# Patient Record
Sex: FEMALE | Race: WHITE | NOT HISPANIC OR LATINO | URBAN - METROPOLITAN AREA
[De-identification: names, ages, dates, MRNs, and addresses within clinical notes are randomized per-mention and may not be internally consistent; named-entity substitution may affect disease eponyms.]

---
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
## 2019-01-08 ENCOUNTER — APPOINTMENT (OUTPATIENT)
Dept: ELECTROPHYSIOLOGY | Facility: CLINIC | Age: 19
End: 2019-01-08
Payer: COMMERCIAL

## 2019-01-08 VITALS
HEART RATE: 88 BPM | DIASTOLIC BLOOD PRESSURE: 74 MMHG | HEIGHT: 63 IN | SYSTOLIC BLOOD PRESSURE: 110 MMHG | OXYGEN SATURATION: 99 %

## 2019-01-08 PROCEDURE — 99214 OFFICE O/P EST MOD 30 MIN: CPT

## 2019-01-14 NOTE — REASON FOR VISIT
[Follow-Up - Clinic] : a clinic follow-up of [Dizziness] : dizziness [Palpitations] : palpitations [FreeTextEntry1] : Referral: Ryan Oconnell NP

## 2019-01-14 NOTE — ASSESSMENT
[FreeTextEntry1] : 30-day Holter monitor:\par Several episodes of symptoms that correlated with normal sinus rhythm. The majority of symptomatic episodes clustered within the 1st week of monitoring. Most palpitations correlated with sinus tachycardia up to the 120s. Occasional dizzy spells a/w sinus rhythm or sinus tach to 110s. There were several auto-triggered episodes of sinus tachycardia not correlated with symptoms. The fastest one of which was at 184 bpm c/w sinus tachycardia (pt reported going to the gym at that time). There were no bradyarrhythmias or significant tachyarrhythmias (VT or SVT).

## 2019-01-14 NOTE — REVIEW OF SYSTEMS
[Fever] : no fever [Chills] : no chills [Blurry Vision] : no blurred vision [Eyeglasses] : not currently wearing eyeglasses [Earache] : no earache [Mouth Sores] : no mouth sores [see HPI] : see HPI [Cough] : no cough [Wheezing] : no wheezing [Coughing Up Blood] : no hemoptysis [Abdominal Pain] : no abdominal pain [Nausea] : no nausea [Vomiting] : no vomiting [Dysuria] : no dysuria [Incontinence] : no incontinence [Joint Pain] : no joint pain [Muscle Cramps] : no muscle cramps [Skin: A Rash] : no rash: [Skin Lesions] : no skin lesions [Dizziness] : no dizziness [Convulsions] : no convulsions [Confusion] : no confusion was observed [Anxiety] : no anxiety [Excessive Thirst] : no polydipsia [Easy Bleeding] : no tendency for easy bleeding [Easy Bruising] : no tendency for easy bruising

## 2019-01-14 NOTE — PHYSICAL EXAM
[General Appearance - Well Developed] : well developed [Well Groomed] : well groomed [General Appearance - Well Nourished] : well nourished [No Deformities] : no deformities [General Appearance - In No Acute Distress] : no acute distress [FreeTextEntry1] : overweight [Normal Conjunctiva] : the conjunctiva exhibited no abnormalities [Normal Oral Mucosa] : normal oral mucosa [Normal Oropharynx] : normal oropharynx [Normal Jugular Venous A Waves Present] : normal jugular venous A waves present [Normal Jugular Venous V Waves Present] : normal jugular venous V waves present [Respiration, Rhythm And Depth] : normal respiratory rhythm and effort [Auscultation Breath Sounds / Voice Sounds] : lungs were clear to auscultation bilaterally [Heart Rate And Rhythm] : heart rate and rhythm were normal [Heart Sounds] : normal S1 and S2 [Murmurs] : no murmurs present [Arterial Pulses Normal] : the arterial pulses were normal [Edema] : no peripheral edema present [Abdomen Soft] : soft [Abdomen Tenderness] : non-tender [Abnormal Walk] : normal gait [Nail Clubbing] : no clubbing of the fingernails [Cyanosis, Localized] : no localized cyanosis [Skin Color & Pigmentation] : normal skin color and pigmentation [Skin Turgor] : normal skin turgor [] : no rash [Oriented To Time, Place, And Person] : oriented to person, place, and time [Impaired Insight] : insight and judgment were intact [Affect] : the affect was normal [Mood] : the mood was normal [No Anxiety] : not feeling anxious

## 2019-01-14 NOTE — DISCUSSION/SUMMARY
[FreeTextEntry1] : 17 yo w/dizziness and palpitations.\par \par Symptoms have improved significantly. Patient continues to remain active and go to the gym. No symptoms were associated with any rhythm other than normal sinus rhythm or sinus tachycardia. Additionally, echo reviewed which revealed a structurally normal heart. Explained to her the benign nature of the results and provided reassurance that no specific therapy is warranted at this time from an electrophysiologic perspective. \par \par Her father is working on getting her in to see neurology to further explore her dizziness episodes. \par \par Plan:\par 1. Continue regular exercise regimen\par 2. RTC prn

## 2019-01-25 ENCOUNTER — APPOINTMENT (OUTPATIENT)
Dept: NEUROLOGY | Facility: CLINIC | Age: 19
End: 2019-01-25
Payer: COMMERCIAL

## 2019-01-25 VITALS
BODY MASS INDEX: 31.89 KG/M2 | WEIGHT: 180 LBS | HEIGHT: 63 IN | DIASTOLIC BLOOD PRESSURE: 80 MMHG | SYSTOLIC BLOOD PRESSURE: 120 MMHG

## 2019-01-25 DIAGNOSIS — I95.1 ORTHOSTATIC HYPOTENSION: ICD-10-CM

## 2019-01-25 DIAGNOSIS — R51 HEADACHE: ICD-10-CM

## 2019-01-25 DIAGNOSIS — Z78.9 OTHER SPECIFIED HEALTH STATUS: ICD-10-CM

## 2019-01-25 DIAGNOSIS — R55 SYNCOPE AND COLLAPSE: ICD-10-CM

## 2019-01-25 PROCEDURE — 99204 OFFICE O/P NEW MOD 45 MIN: CPT

## 2019-01-25 NOTE — PHYSICAL EXAM
[General Appearance - Alert] : alert [General Appearance - In No Acute Distress] : in no acute distress [General Appearance - Well Nourished] : well nourished [General Appearance - Well Developed] : well developed [Person] : oriented to person [Place] : oriented to place [Time] : oriented to time [Short Term Intact] : short term memory intact [Remote Intact] : remote memory intact [Registration Intact] : recent registration memory intact [Span Intact] : the attention span was normal [Concentration Intact] : normal concentrating ability [Visual Intact] : visual attention was ~T not ~L decreased [Naming Objects] : no difficulty naming common objects [Repeating Phrases] : no difficulty repeating a phrase [Fluency] : fluency intact [Comprehension] : comprehension intact [Current Events] : adequate knowledge of current events [Past History] : adequate knowledge of personal past history [Cranial Nerves Optic (II)] : visual acuity intact bilaterally,  visual fields full to confrontation, pupils equal round and reactive to light [Cranial Nerves Oculomotor (III)] : extraocular motion intact [Cranial Nerves Trigeminal (V)] : facial sensation intact symmetrically [Cranial Nerves Facial (VII)] : face symmetrical [Cranial Nerves Vestibulocochlear (VIII)] : hearing was intact bilaterally [Cranial Nerves Glossopharyngeal (IX)] : tongue and palate midline [Cranial Nerves Accessory (XI - Cranial And Spinal)] : head turning and shoulder shrug symmetric [Cranial Nerves Hypoglossal (XII)] : there was no tongue deviation with protrusion [Motor Strength] : muscle strength was normal in all four extremities [No Muscle Atrophy] : normal bulk in all four extremities [Paresis Pronator Drift Right-Sided] : no pronator drift on the right [Paresis Pronator Drift Left-Sided] : no pronator drift on the left [Sensation Tactile Decrease] : light touch was intact [Sensation Pain / Temperature Decrease] : pain and temperature was intact [Sensation Vibration Decrease] : vibration was intact [Proprioception] : proprioception was intact [Balance] : balance was intact [Tremor] : no tremor present [Coordination - Dysmetria Impaired Finger-to-Nose Bilateral] : not present [2+] : Patella left 2+ [Sclera] : the sclera and conjunctiva were normal [PERRL With Normal Accommodation] : pupils were equal in size, round, reactive to light, with normal accommodation [Extraocular Movements] : extraocular movements were intact [Optic Disc Abnormality] : the optic disc were normal in size and color [No APD] : no afferent pupillary defect [No BRIAN] : no internuclear ophthalmoplegia [Full Visual Field] : full visual field [Edema] : there was no peripheral edema [Abnormal Walk] : normal gait [Involuntary Movements] : no involuntary movements were seen [Motor Tone] : muscle strength and tone were normal

## 2019-01-25 NOTE — CONSULT LETTER
[Dear  ___] : Dear  [unfilled], [Consult Letter:] : I had the pleasure of evaluating your patient, [unfilled]. [Please see my note below.] : Please see my note below. [Consult Closing:] : Thank you very much for allowing me to participate in the care of this patient.  If you have any questions, please do not hesitate to contact me. [Sincerely,] : Sincerely, [FreeTextEntry3] : Suleman Mercado M.D., Ph.D. DPN-N\par [facility]\par [facility] [place]\par Medical Director of Stroke Services\par [facility]\par

## 2019-01-25 NOTE — ASSESSMENT
[FreeTextEntry1] : This is a 18-year-old woman with orthostatic hypotension. John is likely blackout of vision when she becomes hypotensive because of decreased cerebral perfusion and ocular perfusion. Her optic discs to him he looked sharp I did not detect any swelling to suggest idiopathic intracranial hypertension however I did suggest that she see a neuro-ophthalmologist for better diagnosis. Regarding the pain in the head upon exertion I suggested that if they continue that she may premedicate that prior to working out. Her examination was normal today and there is no suspicion at this time for epilepsy. However, I did ask her to call me should she actually lose consciousness as opposed to just being dizzy and at that time neuroimaging as was an EEG will be performed. I would be happy to see her again in the future should the need arise.

## 2019-01-25 NOTE — REVIEW OF SYSTEMS
[Tension Headache] : tension-type headaches [As Noted in HPI] : as noted in HPI [Negative] : Heme/Lymph

## 2019-01-25 NOTE — HISTORY OF PRESENT ILLNESS
[FreeTextEntry1] : Initial office visit January 25, 2019:\par This is a 18-year-old woman who presents today for neurologic lesion. She's been getting episodes of dizziness a spinning as well as lightheadedness. It's worse with standing up. Occasionally she'll have the sensation that her vision is blacking out for a few seconds which resolves as she sits down. If she is working out when laying down, such as doing abdominal exercises, when she gets back up after finishing the exercise she may get a pain in the temple on the left which lasts for one to 2 minutes. It is not persists. It is not present during exercise. She saw, just yesterday who questioned if she had idiopathic intracranial hypertension. She does have a known diagnosis of orthostatic hypotension. She is here today for neurologic evaluation.

## 2019-02-05 LAB
ALBUMIN SERPL ELPH-MCNC: 3.9 G/DL
ALP BLD-CCNC: 50 U/L
ALT SERPL-CCNC: 13 U/L
ANION GAP SERPL CALC-SCNC: 10 MMOL/L
AST SERPL-CCNC: 16 U/L
BASOPHILS # BLD AUTO: 0.02 K/UL
BASOPHILS NFR BLD AUTO: 0.2 %
BILIRUB DIRECT SERPL-MCNC: 0.1 MG/DL
BILIRUB INDIRECT SERPL-MCNC: 0.2 MG/DL
BILIRUB SERPL-MCNC: 0.2 MG/DL
BUN SERPL-MCNC: 12 MG/DL
CALCIUM SERPL-MCNC: 9.1 MG/DL
CHLORIDE SERPL-SCNC: 104 MMOL/L
CHOLEST SERPL-MCNC: 163 MG/DL
CHOLEST/HDLC SERPL: 3.3 RATIO
CO2 SERPL-SCNC: 22 MMOL/L
CREAT SERPL-MCNC: 0.67 MG/DL
EOSINOPHIL # BLD AUTO: 0.15 K/UL
EOSINOPHIL NFR BLD AUTO: 1.9 %
GLUCOSE SERPL-MCNC: 78 MG/DL
HBA1C MFR BLD HPLC: 5.3 %
HCT VFR BLD CALC: 39.3 %
HDLC SERPL-MCNC: 49 MG/DL
HGB BLD-MCNC: 12.9 G/DL
IMM GRANULOCYTES NFR BLD AUTO: 0.4 %
INR PPP: 0.93 RATIO
LDLC SERPL CALC-MCNC: 94 MG/DL
LYMPHOCYTES # BLD AUTO: 2.14 K/UL
LYMPHOCYTES NFR BLD AUTO: 26.6 %
MAN DIFF?: NORMAL
MCHC RBC-ENTMCNC: 27.6 PG
MCHC RBC-ENTMCNC: 32.8 GM/DL
MCV RBC AUTO: 84.2 FL
MONOCYTES # BLD AUTO: 0.5 K/UL
MONOCYTES NFR BLD AUTO: 6.2 %
NEUTROPHILS # BLD AUTO: 5.22 K/UL
NEUTROPHILS NFR BLD AUTO: 64.7 %
PLATELET # BLD AUTO: 356 K/UL
POTASSIUM SERPL-SCNC: 4.2 MMOL/L
PROT SERPL-MCNC: 6.5 G/DL
PT BLD: 10.6 SEC
RBC # BLD: 4.67 M/UL
RBC # FLD: 13.5 %
SODIUM SERPL-SCNC: 137 MMOL/L
T3FREE SERPL-MCNC: 3.62 PG/ML
T4 FREE SERPL-MCNC: 1.1 NG/DL
TRIGL SERPL-MCNC: 102 MG/DL
TSH SERPL-ACNC: 1.62 UIU/ML
WBC # FLD AUTO: 8.06 K/UL

## 2019-03-02 ENCOUNTER — APPOINTMENT (OUTPATIENT)
Dept: MRI IMAGING | Facility: CLINIC | Age: 19
End: 2019-03-02
Payer: COMMERCIAL

## 2019-03-02 ENCOUNTER — OUTPATIENT (OUTPATIENT)
Dept: OUTPATIENT SERVICES | Facility: HOSPITAL | Age: 19
LOS: 1 days | End: 2019-03-02
Payer: COMMERCIAL

## 2019-03-02 DIAGNOSIS — Z00.8 ENCOUNTER FOR OTHER GENERAL EXAMINATION: ICD-10-CM

## 2019-03-02 PROCEDURE — A9585: CPT

## 2019-03-02 PROCEDURE — 70543 MRI ORBT/FAC/NCK W/O &W/DYE: CPT | Mod: 26

## 2019-03-02 PROCEDURE — 70543 MRI ORBT/FAC/NCK W/O &W/DYE: CPT

## 2019-03-02 PROCEDURE — 70553 MRI BRAIN STEM W/O & W/DYE: CPT

## 2019-03-02 PROCEDURE — 70553 MRI BRAIN STEM W/O & W/DYE: CPT | Mod: 26

## 2019-03-11 ENCOUNTER — RX RENEWAL (OUTPATIENT)
Age: 19
End: 2019-03-11

## 2019-03-11 DIAGNOSIS — Z87.09 PERSONAL HISTORY OF OTHER DISEASES OF THE RESPIRATORY SYSTEM: ICD-10-CM

## 2019-03-18 ENCOUNTER — OUTPATIENT (OUTPATIENT)
Dept: OUTPATIENT SERVICES | Facility: HOSPITAL | Age: 19
LOS: 1 days | End: 2019-03-18
Payer: COMMERCIAL

## 2019-03-18 ENCOUNTER — TRANSCRIPTION ENCOUNTER (OUTPATIENT)
Age: 19
End: 2019-03-18

## 2019-03-18 VITALS
DIASTOLIC BLOOD PRESSURE: 71 MMHG | WEIGHT: 195.77 LBS | TEMPERATURE: 99 F | HEIGHT: 63 IN | SYSTOLIC BLOOD PRESSURE: 107 MMHG | RESPIRATION RATE: 20 BRPM | HEART RATE: 62 BPM

## 2019-03-18 VITALS
HEIGHT: 63 IN | RESPIRATION RATE: 16 BRPM | OXYGEN SATURATION: 100 % | DIASTOLIC BLOOD PRESSURE: 70 MMHG | WEIGHT: 195.77 LBS | HEART RATE: 77 BPM | TEMPERATURE: 97 F | SYSTOLIC BLOOD PRESSURE: 102 MMHG

## 2019-03-18 VITALS
OXYGEN SATURATION: 100 % | HEART RATE: 64 BPM | SYSTOLIC BLOOD PRESSURE: 100 MMHG | DIASTOLIC BLOOD PRESSURE: 54 MMHG | RESPIRATION RATE: 16 BRPM

## 2019-03-18 DIAGNOSIS — G93.2 BENIGN INTRACRANIAL HYPERTENSION: ICD-10-CM

## 2019-03-18 DIAGNOSIS — Z01.818 ENCOUNTER FOR OTHER PREPROCEDURAL EXAMINATION: ICD-10-CM

## 2019-03-18 DIAGNOSIS — I48.91 UNSPECIFIED ATRIAL FIBRILLATION: Chronic | ICD-10-CM

## 2019-03-18 LAB
APPEARANCE CSF: CLEAR — SIGNIFICANT CHANGE UP
APTT BLD: 30.6 SEC — SIGNIFICANT CHANGE UP (ref 27.5–36.3)
COLOR CSF: SIGNIFICANT CHANGE UP
CRYPTOC AG CSF-ACNC: NEGATIVE — SIGNIFICANT CHANGE UP
CSF PCR RESULT: SIGNIFICANT CHANGE UP
GLUCOSE CSF-MCNC: 64 MG/DLG/24H — SIGNIFICANT CHANGE UP (ref 40–70)
GRAM STN FLD: SIGNIFICANT CHANGE UP
HCG SERPL-ACNC: <4 MIU/ML — SIGNIFICANT CHANGE UP
INR BLD: 0.89 RATIO — SIGNIFICANT CHANGE UP (ref 0.88–1.16)
NEUTROPHILS # CSF: 0 % — SIGNIFICANT CHANGE UP
NRBC NFR CSF: 0 /UL — SIGNIFICANT CHANGE UP (ref 0–5)
PROT CSF-MCNC: 15 MG/DL — SIGNIFICANT CHANGE UP (ref 15–45)
PROTHROM AB SERPL-ACNC: 10.2 SEC — SIGNIFICANT CHANGE UP (ref 10–12.9)
RBC # CSF: 0 /CMM — SIGNIFICANT CHANGE UP (ref 0–1)
SPECIMEN SOURCE: SIGNIFICANT CHANGE UP
TUBE TYPE: SIGNIFICANT CHANGE UP

## 2019-03-18 PROCEDURE — 36415 COLL VENOUS BLD VENIPUNCTURE: CPT

## 2019-03-18 PROCEDURE — 83615 LACTATE (LD) (LDH) ENZYME: CPT

## 2019-03-18 PROCEDURE — 87070 CULTURE OTHR SPECIMN AEROBIC: CPT

## 2019-03-18 PROCEDURE — 84157 ASSAY OF PROTEIN OTHER: CPT

## 2019-03-18 PROCEDURE — 86592 SYPHILIS TEST NON-TREP QUAL: CPT

## 2019-03-18 PROCEDURE — 84702 CHORIONIC GONADOTROPIN TEST: CPT

## 2019-03-18 PROCEDURE — 87205 SMEAR GRAM STAIN: CPT

## 2019-03-18 PROCEDURE — 87476 LYME DIS DNA AMP PROBE: CPT

## 2019-03-18 PROCEDURE — 77003 FLUOROGUIDE FOR SPINE INJECT: CPT

## 2019-03-18 PROCEDURE — 85610 PROTHROMBIN TIME: CPT

## 2019-03-18 PROCEDURE — 85730 THROMBOPLASTIN TIME PARTIAL: CPT

## 2019-03-18 PROCEDURE — 62270 DX LMBR SPI PNXR: CPT

## 2019-03-18 PROCEDURE — 87102 FUNGUS ISOLATION CULTURE: CPT

## 2019-03-18 PROCEDURE — 82945 GLUCOSE OTHER FLUID: CPT

## 2019-03-18 PROCEDURE — 86403 PARTICLE AGGLUT ANTBDY SCRN: CPT

## 2019-03-18 PROCEDURE — 87116 MYCOBACTERIA CULTURE: CPT

## 2019-03-18 PROCEDURE — 77003 FLUOROGUIDE FOR SPINE INJECT: CPT | Mod: 26

## 2019-03-18 PROCEDURE — 87483 CNS DNA AMP PROBE TYPE 12-25: CPT

## 2019-03-18 PROCEDURE — 89051 BODY FLUID CELL COUNT: CPT

## 2019-03-18 RX ORDER — SODIUM CHLORIDE 9 MG/ML
3 INJECTION INTRAMUSCULAR; INTRAVENOUS; SUBCUTANEOUS EVERY 8 HOURS
Qty: 0 | Refills: 0 | Status: DISCONTINUED | OUTPATIENT
Start: 2019-03-18 | End: 2019-04-02

## 2019-03-18 NOTE — H&P PST ADULT - NSICDXPROBLEM_GEN_ALL_CORE_FT
PROBLEM DIAGNOSES  Problem: Benign intracranial hypertension  Assessment and Plan: lumbar puncture  with anesthesia

## 2019-03-18 NOTE — BRIEF OPERATIVE NOTE - OPERATION/FINDINGS
clear csf. opening pressure 28 cm of water. Closing pressure 15 cm of water after 22 cc fluid removed

## 2019-03-18 NOTE — H&P PST ADULT - HISTORY OF PRESENT ILLNESS
18 year old female present with  papilledema and possible benign intracranial hypertension. She is now schedule for lumbar puncture

## 2019-03-18 NOTE — DISCHARGE NOTE PROVIDER - NSDCADMDATE_GEN_ALL_CORE_FT
Addended by: MAGDIEL VELA on: 9/24/2018 08:54 AM     Modules accepted: Level of Service    
18-Mar-2019 07:41

## 2019-03-18 NOTE — H&P PST ADULT - NSICDXFAMILYHX_GEN_ALL_CORE_FT
FAMILY HISTORY:  Family history of bicuspid aortic valve  Family history of high cholesterol  FH: atrial fibrillation  FH: dementia  FH: hypothyroidism  FH: stroke  FH: thyroid cancer  FHx: hypertension    Grandparent  Still living? Unknown  FH: CAD (coronary artery disease), Age at diagnosis: Age Unknown FAMILY HISTORY:  FH: atrial fibrillation  FH: dementia  FH: stroke    Father  Still living? Unknown  Family history of bicuspid aortic valve, Age at diagnosis: Age Unknown  Family history of high cholesterol, Age at diagnosis: Age Unknown  FH: hypothyroidism, Age at diagnosis: Age Unknown    Mother  Still living? Unknown  FH: thyroid cancer, Age at diagnosis: Age Unknown  FHx: hypertension, Age at diagnosis: Age Unknown    Grandparent  Still living? Unknown  FH: CAD (coronary artery disease), Age at diagnosis: Age Unknown FAMILY HISTORY:  FH: atrial fibrillation  FH: dementia  FH: stroke    Father  Still living? Yes, Estimated age: Age Unknown  Family history of bicuspid aortic valve, Age at diagnosis: Age Unknown  Family history of high cholesterol, Age at diagnosis: Age Unknown  FH: hypothyroidism, Age at diagnosis: Age Unknown    Mother  Still living? Yes, Estimated age: Age Unknown  FH: thyroid cancer, Age at diagnosis: Age Unknown  FHx: hypertension, Age at diagnosis: Age Unknown    Grandparent  Still living? Unknown  FH: CAD (coronary artery disease), Age at diagnosis: Age Unknown

## 2019-03-19 LAB
NIGHT BLUE STAIN TISS: SIGNIFICANT CHANGE UP
SPECIMEN SOURCE: SIGNIFICANT CHANGE UP

## 2019-03-20 ENCOUNTER — EMERGENCY (EMERGENCY)
Facility: HOSPITAL | Age: 19
LOS: 1 days | Discharge: HOME | End: 2019-03-20
Attending: EMERGENCY MEDICINE
Payer: COMMERCIAL

## 2019-03-20 VITALS
SYSTOLIC BLOOD PRESSURE: 110 MMHG | OXYGEN SATURATION: 99 % | HEART RATE: 65 BPM | DIASTOLIC BLOOD PRESSURE: 77 MMHG | TEMPERATURE: 98 F | RESPIRATION RATE: 20 BRPM

## 2019-03-20 VITALS
WEIGHT: 195.11 LBS | DIASTOLIC BLOOD PRESSURE: 82 MMHG | SYSTOLIC BLOOD PRESSURE: 120 MMHG | OXYGEN SATURATION: 98 % | TEMPERATURE: 98 F | HEIGHT: 63 IN | HEART RATE: 67 BPM | RESPIRATION RATE: 18 BRPM

## 2019-03-20 PROBLEM — J45.909 UNSPECIFIED ASTHMA, UNCOMPLICATED: Chronic | Status: ACTIVE | Noted: 2019-03-18

## 2019-03-20 PROBLEM — I10 ESSENTIAL (PRIMARY) HYPERTENSION: Chronic | Status: ACTIVE | Noted: 2019-03-18

## 2019-03-20 LAB
HCT VFR BLD CALC: 43.2 % — SIGNIFICANT CHANGE UP (ref 37–47)
HGB BLD-MCNC: 14.2 G/DL — SIGNIFICANT CHANGE UP (ref 12–16)
LDH CSF L TO P-CCNC: 11 U/L — SIGNIFICANT CHANGE UP
LDH FLD-CCNC: 11 U/L — SIGNIFICANT CHANGE UP
MCHC RBC-ENTMCNC: 28 PG — SIGNIFICANT CHANGE UP (ref 27–31)
MCHC RBC-ENTMCNC: 32.9 G/DL — SIGNIFICANT CHANGE UP (ref 32–36)
MCV RBC AUTO: 85.2 FL — SIGNIFICANT CHANGE UP (ref 81–99)
PLATELET # BLD AUTO: 438 K/UL — HIGH (ref 150–400)
RBC # BLD: 5.07 M/UL — SIGNIFICANT CHANGE UP (ref 4.4–5.2)
RBC # FLD: 13.2 % — SIGNIFICANT CHANGE UP (ref 11–15.6)
VDRL CSF-TITR: NEGATIVE — SIGNIFICANT CHANGE UP
WBC # BLD: 8.6 K/UL — SIGNIFICANT CHANGE UP (ref 4.8–10.8)
WBC # FLD AUTO: 8.6 K/UL — SIGNIFICANT CHANGE UP (ref 4.8–10.8)

## 2019-03-20 PROCEDURE — 36415 COLL VENOUS BLD VENIPUNCTURE: CPT

## 2019-03-20 PROCEDURE — 99283 EMERGENCY DEPT VISIT LOW MDM: CPT

## 2019-03-20 PROCEDURE — 99284 EMERGENCY DEPT VISIT MOD MDM: CPT

## 2019-03-20 PROCEDURE — 85027 COMPLETE CBC AUTOMATED: CPT

## 2019-03-20 RX ORDER — SODIUM CHLORIDE 9 MG/ML
1000 INJECTION INTRAMUSCULAR; INTRAVENOUS; SUBCUTANEOUS
Qty: 0 | Refills: 0 | Status: DISCONTINUED | OUTPATIENT
Start: 2019-03-20 | End: 2019-03-25

## 2019-03-20 RX ADMIN — Medication 2 TABLET(S): at 16:24

## 2019-03-20 RX ADMIN — SODIUM CHLORIDE 150 MILLILITER(S): 9 INJECTION INTRAMUSCULAR; INTRAVENOUS; SUBCUTANEOUS at 17:09

## 2019-03-20 NOTE — ED ADULT TRIAGE NOTE - CHIEF COMPLAINT QUOTE
Patient states that she recently had a spinal tap on Monday for idiopathic intracranial hypertension. Patient states that she developed a headache yesterday that has been getting progressively worse. Patient is here for a blood patch,

## 2019-03-20 NOTE — ED STATDOCS - OBJECTIVE STATEMENT
17 y/o F pt with PMHx asthma presents to the ED c/o intermittent HA beginning yesterday.  Pt had spinal tap 2 days ago for idiopathic intercranial hypertensive, and notes today she began to have a severe diffuse HA whenever she sits up or stands up, has relief when laying flat.  She called her doctor today, was advised to come into the ED for a blood patch.  Pt takes diuretic and OCP daily.  Denies fever, chills, N/V, CP, sOB.  No further acute complaints at this time.  Neurologist: Dr. Siddiqi  Radiologist: Dr. Parekh

## 2019-03-20 NOTE — ED ADULT NURSE NOTE - OBJECTIVE STATEMENT
pt care assumed at 1615, no apparent distress noted at this time. pt received Alert and Oriented to person, place, situation and time laying in bed comfortably with parents at bedside. pt c/o headache since yesterday. pt had LP on monday. MD Mccollum at bedside. pt educated to lay flat. pt educated on plan of care, pt able to successfully teach back plan of care to RN, RN will continue to reeducate pt during hospital stay.

## 2019-03-20 NOTE — ED STATDOCS - PROGRESS NOTE DETAILS
Discussed case with Dr. Parekh, Radiology, he suggests blood patch, will call anesthesia. Spoke with NP from pain service who is reaching out to Dr. Correa directly, awaiting call back Scribe Statement (Attending): I Della [place] attest that this documentation has been prepared under the direction and in the presence of Doctor Mccollum d/w NP from pain management, arrangements made to be seen in office tomorrow morning at 830am for blood patch at [address] [place] [address]

## 2019-03-20 NOTE — CONSULT NOTE ADULT - ASSESSMENT
The patient is a 18y Female who is followed by neurology because of post LP headache    Post-LP headache  IV Fluids  caffeine  would benefit from epidural blood patch    Pseudotumor cerebri  continue diamox    will follow with you    Suleman Mercado MD PhD   [id number]

## 2019-03-20 NOTE — ED ADULT NURSE NOTE - NSIMPLEMENTINTERV_GEN_ALL_ED
Implemented All Universal Safety Interventions:  Anchor to call system. Call bell, personal items and telephone within reach. Instruct patient to call for assistance. Room bathroom lighting operational. Non-slip footwear when patient is off stretcher. Physically safe environment: no spills, clutter or unnecessary equipment. Stretcher in lowest position, wheels locked, appropriate side rails in place.

## 2019-03-20 NOTE — ED STATDOCS - MUSCULOSKELETAL, MLM
Moving all extremities spontaneously. Moving all extremities spontaneously. LP puncture wound clean, no signs of infection.

## 2019-03-20 NOTE — CONSULT NOTE ADULT - SUBJECTIVE AND OBJECTIVE BOX
[facility]                                     Neurology at [place]                                 Jess Mock, & Shravan                                  [address]                                        [place], [place], [address]                                             [phone]    CC: headache  HPI: The patient is a 18y Female whom presented with headache, worse sitting up right.  She had LP 2 days ago.  She has no nausea or vomiting.  She denies photophobia or visual changes. She has recently diagnosed pseudotumor cerebri, this was diagnosed as a result of the LP that was done for headache with papilledema.  Neurology eval is requested.    PAST MEDICAL & SURGICAL HISTORY:  Mild asthma  Orthostatic hypertension  pseudotumor cerebri  No significant past surgical history      MEDICATIONS  (STANDING):  sodium chloride 0.9%. 1000 milliLiter(s) (150 mL/Hr) IV Continuous <Continuous>    MEDICATIONS  (PRN):  Home Medications:  diamox 500 bid    Allergies    Bactrim (Rash)    Intolerances    trimethoprim (Rash)      SOCIAL HISTORY:  no tob,   no alcohol   no drugs    FAMILY HISTORY:  FH: CAD (coronary artery disease) (Grandparent): stent  FH: dementia  FH: atrial fibrillation  FH: stroke  Family history of bicuspid aortic valve (Father)  Family history of high cholesterol (Father)  FH: hypothyroidism (Father)  FH: thyroid cancer (Mother)  FHx: hypertension (Mother)        ROS: 14 point ROS negative other than what is present in HPI or below  as in HPI    Vital Signs Last 24 Hrs  T(C): 36.6 (20 Mar 2019 14:37), Max: 36.6 (20 Mar 2019 14:37)  T(F): 97.8 (20 Mar 2019 14:37), Max: 97.8 (20 Mar 2019 14:37)  HR: 67 (20 Mar 2019 14:37) (67 - 67)  BP: 120/82 (20 Mar 2019 14:37) (120/82 - 120/82)  BP(mean): --  RR: 18 (20 Mar 2019 14:37) (18 - 18)  SpO2: 98% (20 Mar 2019 14:37) (98% - 98%)      General: NAD    Detailed Neurologic Exam:    Mental status: The patient is awake and alert and has normal attention span.  The patient is fully oriented in 3 spheres. The patient is oriented to current events. The patient is able to name objects, follow commands, repeat sentences.    Neck: no nucchal rigidity    Optic fundi: right eye disk appeared flat, mild papilledema left eye    Cranial nerves: Pupils equal and react symmetrically to light. There is no visual field deficit to confrontation. Extraocular motion is full with no nystagmus. There is no ptosis. Facial sensation is intact. Facial musculature is symmetric. Palate elevates symmetrically. Shoulder shrug is normal. Tongue is midline.    Motor: There is normal bulk and tone.  There is no tremor.  Strength is 5/5 in the right arm and leg.   Strength is 5/5 in the left arm and leg.    Sensation: Intact to light touch and pin in 4 extremities    Reflexes: 2+ throughout and plantar responses are flexor.    Cerebellar: There is no dysmetria on finger to nose testing.    Gait : deferred    LABS:     Cerebrospinal Fluid Cell Count-1 (03.18.19 @ 11:56)    Total Nucleated Cell Count, CSF: 0 /uL    CSF Color: No Color    CSF Appearance: Clear    CSF Segmented Neutrophils: 0 %  Protein, CSF (03.18.19 @ 11:56)    Protein, CSF: 15 mg/dL    Glucose, CSF (03.18.19 @ 11:56)    Glucose, CSF: 64 mg/dLG/24h    CSF PCR Panel (03.18.19 @ 17:24)    CSF PCR Result: NotDetec: The meningitis/encephalitis (ME) panel (CSFPCR) is a PCR based assay that  screens for: Escherichia coli; Haemophilus influenzae; Listeria  monocytogenes; Neisseria meningitidis; Streptococcus agalactiae;  Streptococcus pneumoniae; CMV; Enterovirus; HSV-1; HSV-2; Human  herpesvirus 6; Parechovirus; VZV and Cryptococcus. Result should be  interpreted in context of clinical presentation, imaging and other lab  tests. Positive predictive value may be lower in patients with normal CSF  chemistry and cell count.                      RADIOLOGY & ADDITIONAL STUDIES (independently reviewed unless otherwise noted):  ***

## 2019-03-22 LAB
CULTURE RESULTS: SIGNIFICANT CHANGE UP
SPECIMEN SOURCE: SIGNIFICANT CHANGE UP

## 2019-03-28 RX ORDER — ALPRAZOLAM 0.25 MG
1 TABLET ORAL
Qty: 1 | Refills: 0 | OUTPATIENT
Start: 2019-03-28

## 2019-03-29 ENCOUNTER — APPOINTMENT (OUTPATIENT)
Dept: MRI IMAGING | Facility: CLINIC | Age: 19
End: 2019-03-29
Payer: COMMERCIAL

## 2019-03-29 ENCOUNTER — OUTPATIENT (OUTPATIENT)
Dept: OUTPATIENT SERVICES | Facility: HOSPITAL | Age: 19
LOS: 1 days | End: 2019-03-29
Payer: COMMERCIAL

## 2019-03-29 DIAGNOSIS — Z00.8 ENCOUNTER FOR OTHER GENERAL EXAMINATION: ICD-10-CM

## 2019-03-29 PROCEDURE — 70544 MR ANGIOGRAPHY HEAD W/O DYE: CPT

## 2019-03-29 PROCEDURE — 70544 MR ANGIOGRAPHY HEAD W/O DYE: CPT | Mod: 26

## 2019-03-31 LAB — B BURGDOR DNA SPEC QL NAA+PROBE: NEGATIVE — SIGNIFICANT CHANGE UP

## 2019-04-05 ENCOUNTER — APPOINTMENT (OUTPATIENT)
Dept: NEUROLOGY | Facility: CLINIC | Age: 19
End: 2019-04-05
Payer: COMMERCIAL

## 2019-04-05 VITALS
SYSTOLIC BLOOD PRESSURE: 110 MMHG | HEIGHT: 63 IN | BODY MASS INDEX: 33.13 KG/M2 | DIASTOLIC BLOOD PRESSURE: 60 MMHG | WEIGHT: 187 LBS

## 2019-04-05 PROCEDURE — 99213 OFFICE O/P EST LOW 20 MIN: CPT

## 2019-04-05 NOTE — HISTORY OF PRESENT ILLNESS
[FreeTextEntry1] : Initial office visit January 25, 2019:\par This is a 18-year-old woman who presents today for neurologic lesion. She's been getting episodes of dizziness a spinning as well as lightheadedness. It's worse with standing up. Occasionally she'll have the sensation that her vision is blacking out for a few seconds which resolves as she sits down. If she is working out when laying down, such as doing abdominal exercises, when she gets back up after finishing the exercise she may get a pain in the temple on the left which lasts for one to 2 minutes. It is not persists. It is not present during exercise. She saw, just yesterday who questioned if she had idiopathic intracranial hypertension. She does have a known diagnosis of orthostatic hypotension. She is here today for neurologic evaluation.\par \par Followup April 5, 2019:\par This is a 18-year-old woman returns today for followup of pseudotumor cerebri. Since her last visit she has had a spinal tap which showed elevated pressure. 2 post spinal headache and had an epidural blood patch which helped. She has seen Dr. Jordan again had residual papilledema and is now on Diamox 1000 mg in the morning and 500 mg in the afternoon. She has paresthesia in fatigue from this but is otherwise better with her headaches. She is here today for neurologic followup.

## 2019-04-05 NOTE — DATA REVIEWED
[de-identified] : MRV of her brain was recently done and did not show evidence for any dural sinus thrombosis.

## 2019-04-05 NOTE — PHYSICAL EXAM
[Person] : oriented to person [Place] : oriented to place [Time] : oriented to time [Remote Intact] : remote memory intact [Registration Intact] : recent registration memory intact [Span Intact] : the attention span was normal [Concentration Intact] : normal concentrating ability [Visual Intact] : visual attention was ~T not ~L decreased [Naming Objects] : no difficulty naming common objects [Repeating Phrases] : no difficulty repeating a phrase [Fluency] : fluency intact [Comprehension] : comprehension intact [Current Events] : adequate knowledge of current events [Past History] : adequate knowledge of personal past history [Cranial Nerves Optic (II)] : visual acuity intact bilaterally,  visual fields full to confrontation, pupils equal round and reactive to light [Cranial Nerves Oculomotor (III)] : extraocular motion intact [Cranial Nerves Trigeminal (V)] : facial sensation intact symmetrically [Cranial Nerves Facial (VII)] : face symmetrical [Cranial Nerves Vestibulocochlear (VIII)] : hearing was intact bilaterally [Cranial Nerves Glossopharyngeal (IX)] : tongue and palate midline [Cranial Nerves Accessory (XI - Cranial And Spinal)] : head turning and shoulder shrug symmetric [Cranial Nerves Hypoglossal (XII)] : there was no tongue deviation with protrusion [Motor Tone] : muscle tone was normal in all four extremities [Motor Strength] : muscle strength was normal in all four extremities [Involuntary Movements] : no involuntary movements were seen [No Muscle Atrophy] : normal bulk in all four extremities [Paresis Pronator Drift Right-Sided] : no pronator drift on the right [Paresis Pronator Drift Left-Sided] : no pronator drift on the left [Sensation Tactile Decrease] : light touch was intact [Sensation Pain / Temperature Decrease] : pain and temperature was intact [Sensation Vibration Decrease] : vibration was intact [Proprioception] : proprioception was intact [Abnormal Walk] : normal gait [Balance] : balance was intact [Tremor] : no tremor present [Coordination - Dysmetria Impaired Finger-to-Nose Bilateral] : not present [2+] : Patella left 2+ [Sclera] : the sclera and conjunctiva were normal [PERRL With Normal Accommodation] : pupils were equal in size, round, reactive to light, with normal accommodation [Extraocular Movements] : extraocular movements were intact [No APD] : no afferent pupillary defect [No BRIAN] : no internuclear ophthalmoplegia [Full Visual Field] : full visual field [Papilledema Of Both Eyes] : papilledema [FreeTextEntry1] : mild b/l paipilledema

## 2019-04-05 NOTE — ASSESSMENT
[FreeTextEntry1] : This is a 18-year-old with pseudotumor cerebri. She's overall stable. We will continue the scissors all mind 1000 mg in the morning and 500 mg at night. I will see her back in 3 months. I have asked her to check a blood chemistries in about one month to make sure that everything is okay and her bicarbonate has not dropped due to the medication.

## 2019-04-05 NOTE — CONSULT LETTER
[Dear  ___] : Dear  [unfilled], [Courtesy Letter:] : I had the pleasure of seeing your patient, [unfilled], in my office today. [Please see my note below.] : Please see my note below. [Consult Closing:] : Thank you very much for allowing me to participate in the care of this patient.  If you have any questions, please do not hesitate to contact me. [Sincerely,] : Sincerely, [FreeTextEntry3] : Suleman Mercado M.D., Ph.D. DPN-N\par [facility]\par Neurology at [place]\par Medical Director of Stroke Services\par [facility]\par

## 2019-04-08 LAB
CULTURE RESULTS: SIGNIFICANT CHANGE UP
SPECIMEN SOURCE: SIGNIFICANT CHANGE UP

## 2019-04-29 ENCOUNTER — MEDICATION RENEWAL (OUTPATIENT)
Age: 19
End: 2019-04-29

## 2019-05-25 LAB
CULTURE RESULTS: SIGNIFICANT CHANGE UP
SPECIMEN SOURCE: SIGNIFICANT CHANGE UP

## 2019-07-08 ENCOUNTER — APPOINTMENT (OUTPATIENT)
Dept: NEUROLOGY | Facility: CLINIC | Age: 19
End: 2019-07-08
Payer: COMMERCIAL

## 2019-07-08 VITALS
SYSTOLIC BLOOD PRESSURE: 110 MMHG | BODY MASS INDEX: 32.78 KG/M2 | DIASTOLIC BLOOD PRESSURE: 70 MMHG | HEIGHT: 63 IN | WEIGHT: 185 LBS

## 2019-07-08 PROCEDURE — 99213 OFFICE O/P EST LOW 20 MIN: CPT

## 2019-07-08 NOTE — PHYSICAL EXAM
[Person] : oriented to person [Time] : oriented to time [Place] : oriented to place [Remote Intact] : remote memory intact [Span Intact] : the attention span was normal [Registration Intact] : recent registration memory intact [Visual Intact] : visual attention was ~T not ~L decreased [Concentration Intact] : normal concentrating ability [Repeating Phrases] : no difficulty repeating a phrase [Naming Objects] : no difficulty naming common objects [Fluency] : fluency intact [Comprehension] : comprehension intact [Past History] : adequate knowledge of personal past history [Current Events] : adequate knowledge of current events [Cranial Nerves Oculomotor (III)] : extraocular motion intact [Cranial Nerves Optic (II)] : visual acuity intact bilaterally,  visual fields full to confrontation, pupils equal round and reactive to light [Cranial Nerves Trigeminal (V)] : facial sensation intact symmetrically [Cranial Nerves Facial (VII)] : face symmetrical [Cranial Nerves Vestibulocochlear (VIII)] : hearing was intact bilaterally [Cranial Nerves Accessory (XI - Cranial And Spinal)] : head turning and shoulder shrug symmetric [Cranial Nerves Glossopharyngeal (IX)] : tongue and palate midline [Motor Tone] : muscle tone was normal in all four extremities [Cranial Nerves Hypoglossal (XII)] : there was no tongue deviation with protrusion [Motor Strength] : muscle strength was normal in all four extremities [Involuntary Movements] : no involuntary movements were seen [Paresis Pronator Drift Right-Sided] : no pronator drift on the right [No Muscle Atrophy] : normal bulk in all four extremities [Paresis Pronator Drift Left-Sided] : no pronator drift on the left [Sensation Tactile Decrease] : light touch was intact [Sensation Vibration Decrease] : vibration was intact [Sensation Pain / Temperature Decrease] : pain and temperature was intact [Proprioception] : proprioception was intact [Abnormal Walk] : normal gait [Balance] : balance was intact [Tremor] : no tremor present [Coordination - Dysmetria Impaired Finger-to-Nose Bilateral] : not present [2+] : Patella left 2+ [Sclera] : the sclera and conjunctiva were normal [PERRL With Normal Accommodation] : pupils were equal in size, round, reactive to light, with normal accommodation [No APD] : no afferent pupillary defect [Optic Disc Abnormality] : the optic disc were normal in size and color [Extraocular Movements] : extraocular movements were intact [No BRIAN] : no internuclear ophthalmoplegia [Full Visual Field] : full visual field [Papilledema Of Both Eyes] : no papilledema

## 2019-07-08 NOTE — CONSULT LETTER
[Dear  ___] : Dear  [unfilled], [Courtesy Letter:] : I had the pleasure of seeing your patient, [unfilled], in my office today. [Consult Closing:] : Thank you very much for allowing me to participate in the care of this patient.  If you have any questions, please do not hesitate to contact me. [Please see my note below.] : Please see my note below. [Sincerely,] : Sincerely, [FreeTextEntry3] : Suleman Mercado M.D., Ph.D. DPN-N\par [facility]\par Neurology at [place]\par Medical Director of Stroke Services\par [facility]\par

## 2019-07-08 NOTE — HISTORY OF PRESENT ILLNESS
[FreeTextEntry1] : Initial office visit January 25, 2019:\par This is a 18-year-old woman who presents today for neurologic lesion. She's been getting episodes of dizziness a spinning as well as lightheadedness. It's worse with standing up. Occasionally she'll have the sensation that her vision is blacking out for a few seconds which resolves as she sits down. If she is working out when laying down, such as doing abdominal exercises, when she gets back up after finishing the exercise she may get a pain in the temple on the left which lasts for one to 2 minutes. It is not persists. It is not present during exercise. She saw, just yesterday who questioned if she had idiopathic intracranial hypertension. She does have a known diagnosis of orthostatic hypotension. She is here today for neurologic evaluation.\par \par Followup April 5, 2019:\par This is a 18-year-old woman returns today for followup of pseudotumor cerebri. Since her last visit she has had a spinal tap which showed elevated pressure. 2 post spinal headache and had an epidural blood patch which helped. She has seen Dr. Jordan again had residual papilledema and is now on Diamox 1000 mg in the morning and 500 mg in the afternoon. She has paresthesia in fatigue from this but is otherwise better with her headaches. She is here today for neurologic followup.\par \par Followup July 8, 2019:\par This is a 18-year-old woman who presents today for followup of pseudotumor cerebri. She's doing well in Diamox 1500 mg total daily. Her eye exam is within stable per her report she saw Dr. Bridges 2 weeks ago. She reports a papilledema and is improved. She tolerated the Diamox well and no longer having paresthesia. She no longer has post LP headaches. His CSF showed a contaminant, she has no active meningitis. She is here today for a neurologic followup.

## 2019-07-08 NOTE — ASSESSMENT
[FreeTextEntry1] : This is a 18-year-old woman with pseudotumor cerebri, currently stable on Diamox 1500 mg total daily. She will continue this. I will see her back in the office in 6 months. She's been told to call me should she develop any headaches or visual loss between now and her next appointment.

## 2019-08-07 NOTE — ASU PATIENT PROFILE, ADULT - HARM RISK FACTORS
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 Followed in past by Dr. Chen last seen by him last in July 2018. Seen by Dr. Esquivel on 8/1. Pt currently not on immunosuppressive therapy 2/2 recurrent infections. Per chart review it appears that she has taken MTX, remicade, HCQ and possibly RTX in the past. It appears as though she has been off MTX since 2015. It was stopped due to pneumonia & cholecystitis. She is currently on prednisone 10 mg/d but she does not know why. She states it is not for RA    Rheumatology consulted for L shoulder pain possible RA flare.    - ESR 68, CRP 44  - Left shoulder x-ray negative for acute bony abnormalities. U/S negative for DVT.  - MRI notable for effusion s/p aspiration in ED of bloody fluid per ortho       - 8/7  left shoulder aspiration:  wbc 6760 92% segs Crystal neg. Gram stain > no microorganism seen Cult pending.       -Repeat aspiration:  wbc 107,250 89% segs Crystal neg.  AFB neg Cult pending      - 8/7 left elbow:  wbc 1434 81% segs Crystal neg Cult pending      - Vancomycin and CTX started per primary     Plan:  - Fluid studies consistent with acute septic arthritis  - Per Ortho, plan for OR today for Irrigation and debridement of left shoulder today  - F/u aspiration cultures   - Recommend Cervical flexion xrays to evaluate for C1-C2 subluxation in RA pt prior to surgery  - F/u in clinic with Dr. Chen as scheduled     Discussed with primary team.  Discussed with Dr. Jasvir Baugh.    no

## 2019-09-04 ENCOUNTER — RX RENEWAL (OUTPATIENT)
Age: 19
End: 2019-09-04

## 2019-10-01 ENCOUNTER — RECORD ABSTRACTING (OUTPATIENT)
Age: 19
End: 2019-10-01

## 2019-10-01 DIAGNOSIS — Z92.89 PERSONAL HISTORY OF OTHER MEDICAL TREATMENT: ICD-10-CM

## 2019-10-01 DIAGNOSIS — L70.9 ACNE, UNSPECIFIED: ICD-10-CM

## 2019-10-02 ENCOUNTER — RX RENEWAL (OUTPATIENT)
Age: 19
End: 2019-10-02

## 2019-11-18 ENCOUNTER — APPOINTMENT (OUTPATIENT)
Dept: OBGYN | Facility: CLINIC | Age: 19
End: 2019-11-18
Payer: COMMERCIAL

## 2019-11-18 VITALS
WEIGHT: 190 LBS | BODY MASS INDEX: 33.66 KG/M2 | HEIGHT: 63 IN | DIASTOLIC BLOOD PRESSURE: 62 MMHG | SYSTOLIC BLOOD PRESSURE: 100 MMHG

## 2019-11-18 PROCEDURE — XXXXX: CPT

## 2019-11-18 NOTE — HISTORY OF PRESENT ILLNESS
[Good] : being in good health [1 Year Ago] : 1 year ago [Healthy Diet] : a healthy diet [Regular Exercise] : regular exercise [Reproductive Age] : is of reproductive age [Definite:  ___ (Date)] : the last menstrual period was [unfilled] [Menarche Age: ____] : age at menarche was [unfilled] [Sexually Active] : is sexually active [Monogamous] : is monogamous [Contraception] : uses contraception [Oral Contraceptives] : uses oral contraceptives [Male ___] : [unfilled] male [Pregnancy History] : denies prior pregnancies [de-identified] : cheikh/charles- 11/17/18 neg

## 2019-11-18 NOTE — END OF VISIT
[FreeTextEntry3] : I, Christine Osbaldo, acted solely as a scribe for Dr. Browne on this 11/18/19. \par \par All medical record entries made by the Scribe were at Dr. Browne's direction and personally dictated by me on 11/18/19. I have reviewed the chart and agree that the record accurately reflects my personal performance of history, physical exam, assessment and plan. I have also personally directed, reviewed, and agreed with the chart. \par

## 2019-11-18 NOTE — COUNSELING
[Vitamins/Supplements] : vitamins/supplements [Breast Self Exam] : breast self exam [Contraception] : contraception

## 2019-11-18 NOTE — PHYSICAL EXAM
[Awake] : awake [Alert] : alert [Acute Distress] : no acute distress [Mass] : no breast mass [Nipple Discharge] : no nipple discharge [Axillary LAD] : no axillary lymphadenopathy [Soft] : soft [Tender] : non tender [Distended] : not distended [Oriented x3] : oriented to person, place, and time [Depressed Mood] : not depressed [Labia Majora] : labia major [Labia Minora] : labia minora [Discharge] : a  ~M vaginal discharge was present [Normal] : clitoris [Uterine Adnexae] : were not tender and not enlarged [No Bleeding] : there was no active vaginal bleeding

## 2020-01-10 ENCOUNTER — APPOINTMENT (OUTPATIENT)
Dept: NEUROLOGY | Facility: CLINIC | Age: 20
End: 2020-01-10
Payer: COMMERCIAL

## 2020-01-10 VITALS
SYSTOLIC BLOOD PRESSURE: 100 MMHG | DIASTOLIC BLOOD PRESSURE: 70 MMHG | WEIGHT: 185 LBS | HEIGHT: 63 IN | BODY MASS INDEX: 32.78 KG/M2

## 2020-01-10 PROCEDURE — 99213 OFFICE O/P EST LOW 20 MIN: CPT

## 2020-01-10 NOTE — HISTORY OF PRESENT ILLNESS
[FreeTextEntry1] : Initial office visit January 25, 2019:\par This is a 18-year-old woman who presents today for neurologic lesion. She's been getting episodes of dizziness a spinning as well as lightheadedness. It's worse with standing up. Occasionally she'll have the sensation that her vision is blacking out for a few seconds which resolves as she sits down. If she is working out when laying down, such as doing abdominal exercises, when she gets back up after finishing the exercise she may get a pain in the temple on the left which lasts for one to 2 minutes. It is not persists. It is not present during exercise. She saw, just yesterday who questioned if she had idiopathic intracranial hypertension. She does have a known diagnosis of orthostatic hypotension. She is here today for neurologic evaluation.\par \par Followup April 5, 2019:\par This is a 18-year-old woman returns today for followup of pseudotumor cerebri. Since her last visit she has had a spinal tap which showed elevated pressure. 2 post spinal headache and had an epidural blood patch which helped. She has seen Dr. Jordan again had residual papilledema and is now on Diamox 1000 mg in the morning and 500 mg in the afternoon. She has paresthesia in fatigue from this but is otherwise better with her headaches. She is here today for neurologic followup.\par \par Followup July 8, 2019:\par This is a 18-year-old woman who presents today for followup of pseudotumor cerebri. She's doing well in Diamox 1500 mg total daily. Her eye exam is within stable per her report she saw Dr. Bridges 2 weeks ago. She reports a papilledema and is improved. She tolerated the Diamox well and no longer having paresthesia. She no longer has post LP headaches. His CSF showed a contaminant, she has no active meningitis. She is here today for a neurologic followup.\par \par Followup January 10, 2020:\par This is a 19-year-old woman who presents today for followup of pseudotumor cerebri. She is doing well on Diamox 1500 mg total daily. She recently saw Dr. Jordan reports a stable I exam. She does not report headaches or vision loss. She is here today for a neurologic followup.

## 2020-01-10 NOTE — ASSESSMENT
[FreeTextEntry1] : This is a 19-year-old woman with a stable pseudotumor cerebri. She will continue Diamox. I will see her back in 6 months, sooner should the need arise. I again informed her that should she have any headaches or vision changes that she needs to call me for evaluation of whether or not she needs another lumbar puncture versus changing her medication.

## 2020-01-10 NOTE — CONSULT LETTER
[Dear  ___] : Dear  [unfilled], [Courtesy Letter:] : I had the pleasure of seeing your patient, [unfilled], in my office today. [Please see my note below.] : Please see my note below. [Consult Closing:] : Thank you very much for allowing me to participate in the care of this patient.  If you have any questions, please do not hesitate to contact me. [Sincerely,] : Sincerely, [FreeTextEntry3] : Suleman Mercado M.D., Ph.D. DPN-N\par [facility]\par Neurology at [place]\par Medical Director of Stroke Services\par [facility]\par

## 2020-01-10 NOTE — PHYSICAL EXAM
[Person] : oriented to person [Remote Intact] : remote memory intact [Place] : oriented to place [Time] : oriented to time [Span Intact] : the attention span was normal [Registration Intact] : recent registration memory intact [Visual Intact] : visual attention was ~T not ~L decreased [Concentration Intact] : normal concentrating ability [Repeating Phrases] : no difficulty repeating a phrase [Naming Objects] : no difficulty naming common objects [Fluency] : fluency intact [Comprehension] : comprehension intact [Current Events] : adequate knowledge of current events [Cranial Nerves Optic (II)] : visual acuity intact bilaterally,  visual fields full to confrontation, pupils equal round and reactive to light [Past History] : adequate knowledge of personal past history [Cranial Nerves Trigeminal (V)] : facial sensation intact symmetrically [Cranial Nerves Oculomotor (III)] : extraocular motion intact [Cranial Nerves Vestibulocochlear (VIII)] : hearing was intact bilaterally [Cranial Nerves Facial (VII)] : face symmetrical [Cranial Nerves Glossopharyngeal (IX)] : tongue and palate midline [Cranial Nerves Accessory (XI - Cranial And Spinal)] : head turning and shoulder shrug symmetric [Cranial Nerves Hypoglossal (XII)] : there was no tongue deviation with protrusion [Motor Strength] : muscle strength was normal in all four extremities [Motor Tone] : muscle tone was normal in all four extremities [Involuntary Movements] : no involuntary movements were seen [Paresis Pronator Drift Right-Sided] : no pronator drift on the right [No Muscle Atrophy] : normal bulk in all four extremities [Paresis Pronator Drift Left-Sided] : no pronator drift on the left [Motor Strength Upper Extremities Bilaterally] : strength was normal in both upper extremities [Motor Strength Lower Extremities Bilaterally] : strength was normal in both lower extremities [Sensation Tactile Decrease] : light touch was intact [Sensation Vibration Decrease] : vibration was intact [Sensation Pain / Temperature Decrease] : pain and temperature was intact [Proprioception] : proprioception was intact [Abnormal Walk] : normal gait [Balance] : balance was intact [Tremor] : no tremor present [Coordination - Dysmetria Impaired Finger-to-Nose Bilateral] : not present [2+] : Brachioradialis left 2+ [Sclera] : the sclera and conjunctiva were normal [PERRL With Normal Accommodation] : pupils were equal in size, round, reactive to light, with normal accommodation [Extraocular Movements] : extraocular movements were intact [Optic Disc Abnormality] : the optic disc were normal in size and color [No APD] : no afferent pupillary defect [No BRIAN] : no internuclear ophthalmoplegia [Full Visual Field] : full visual field [Papilledema Of Both Eyes] : no papilledema

## 2020-03-01 ENCOUNTER — TRANSCRIPTION ENCOUNTER (OUTPATIENT)
Age: 20
End: 2020-03-01

## 2020-06-22 ENCOUNTER — APPOINTMENT (OUTPATIENT)
Dept: NEUROLOGY | Facility: CLINIC | Age: 20
End: 2020-06-22
Payer: COMMERCIAL

## 2020-06-22 PROCEDURE — 99213 OFFICE O/P EST LOW 20 MIN: CPT | Mod: 95

## 2020-06-22 NOTE — CONSULT LETTER
[Dear  ___] : Dear  [unfilled], [Courtesy Letter:] : I had the pleasure of seeing your patient, [unfilled], in my office today. [Please see my note below.] : Please see my note below. [Sincerely,] : Sincerely, [Consult Closing:] : Thank you very much for allowing me to participate in the care of this patient.  If you have any questions, please do not hesitate to contact me. [FreeTextEntry3] : Suleman Mercado M.D., Ph.D. DPN-N\[facility]\par Neurology at [place]\par Medical Director of Stroke Services\par [facility]\par

## 2020-06-22 NOTE — PHYSICAL EXAM
[FreeTextEntry1] : Neurologic examination was limited due to the video call.\par \par Mental status: Awake and alert fully oriented to person place and time. There is no aphasia.\par \par Cranial nerves:  Full extraocular movements. There is no nystagmus. Normal facial sensation and motor function. Tongue was in the midline.\par \par Motor: no pronator drift  bilaterally.\par \par Sensation: Light touch was intact \par \par Coordination: Deferred\par \par Gait:  deferred\par

## 2020-06-22 NOTE — HISTORY OF PRESENT ILLNESS
[Home] : at home, [unfilled] , at the time of the visit. [Verbal consent obtained from patient] : the patient, [unfilled] [Medical Office: ([facility])___] : at the medical office located in  [FreeTextEntry1] : Initial office visit January 25, 2019:\par This is a 18-year-old woman who presents today for neurologic lesion. She's been getting episodes of dizziness a spinning as well as lightheadedness. It's worse with standing up. Occasionally she'll have the sensation that her vision is blacking out for a few seconds which resolves as she sits down. If she is working out when laying down, such as doing abdominal exercises, when she gets back up after finishing the exercise she may get a pain in the temple on the left which lasts for one to 2 minutes. It is not persists. It is not present during exercise. She saw, just yesterday who questioned if she had idiopathic intracranial hypertension. She does have a known diagnosis of orthostatic hypotension. She is here today for neurologic evaluation.\par \par Followup April 5, 2019:\par This is a 18-year-old woman returns today for followup of pseudotumor cerebri. Since her last visit she has had a spinal tap which showed elevated pressure. 2 post spinal headache and had an epidural blood patch which helped. She has seen Dr. Jordan again had residual papilledema and is now on Diamox 1000 mg in the morning and 500 mg in the afternoon. She has paresthesia in fatigue from this but is otherwise better with her headaches. She is here today for neurologic followup.\par \par Followup July 8, 2019:\par This is a 18-year-old woman who presents today for followup of pseudotumor cerebri. She's doing well in Diamox 1500 mg total daily. Her eye exam is within stable per her report she saw Dr. Bridges 2 weeks ago. She reports a papilledema and is improved. She tolerated the Diamox well and no longer having paresthesia. She no longer has post LP headaches. His CSF showed a contaminant, she has no active meningitis. She is here today for a neurologic followup.\par \par Followup January 10, 2020:\par This is a 19-year-old woman who presents today for followup of pseudotumor cerebri. She is doing well on Diamox 1500 mg total daily. She recently saw Dr. Jordan reports a stable I exam. She does not report headaches or vision loss. She is here today for a neurologic followup.\par \par Followup June 22, 2020:\par This is a 19-year-old woman who presents today for followup of pseudotumor cerebri. She is seen by video visit during the corona virus outbreak. Verbal consent was obtained at the time of the visit. She understood I would not be able to visualize her fundi during this visit. She stated that she recently saw Dr. Bridges from neuro ophthalmology. who said her discs looked as best as I have in a long time. He is not experiencing severe headaches, when she does she takes Tylenol and it's resolved. She is not having any visual loss. She presents today for video visit for routine followup.

## 2020-06-22 NOTE — ASSESSMENT
[FreeTextEntry1] : This is a 19-year-old woman with pseudotumor cerebri. She follows routinely with neuro ophthalmology who performs serial funduscopic examinations. She will continue Diamox 1500 mg total daily. She is tolerating it well. I have sent a renewal for her with three-month supply. I will see her back in the office in 6 months, sooner should the need arise.

## 2020-07-10 ENCOUNTER — APPOINTMENT (OUTPATIENT)
Dept: NEUROLOGY | Facility: CLINIC | Age: 20
End: 2020-07-10

## 2020-09-11 ENCOUNTER — APPOINTMENT (OUTPATIENT)
Dept: ORTHOPEDIC SURGERY | Facility: CLINIC | Age: 20
End: 2020-09-11

## 2020-11-13 RX ORDER — ACETAZOLAMIDE 500 MG/1
500 CAPSULE ORAL
Qty: 270 | Refills: 3 | Status: DISCONTINUED | COMMUNITY
Start: 1900-01-01 | End: 2020-11-13

## 2020-11-17 ENCOUNTER — TRANSCRIPTION ENCOUNTER (OUTPATIENT)
Age: 20
End: 2020-11-17

## 2020-11-20 ENCOUNTER — APPOINTMENT (OUTPATIENT)
Dept: OBGYN | Facility: CLINIC | Age: 20
End: 2020-11-20
Payer: COMMERCIAL

## 2020-11-20 VITALS
SYSTOLIC BLOOD PRESSURE: 130 MMHG | HEIGHT: 63 IN | BODY MASS INDEX: 37.56 KG/M2 | DIASTOLIC BLOOD PRESSURE: 82 MMHG | WEIGHT: 212 LBS | HEART RATE: 97 BPM

## 2020-11-20 DIAGNOSIS — Z80.3 FAMILY HISTORY OF MALIGNANT NEOPLASM OF BREAST: ICD-10-CM

## 2020-11-20 DIAGNOSIS — Z11.3 ENCOUNTER FOR SCREENING FOR INFECTIONS WITH A PREDOMINANTLY SEXUAL MODE OF TRANSMISSION: ICD-10-CM

## 2020-11-20 PROCEDURE — 99072 ADDL SUPL MATRL&STAF TM PHE: CPT

## 2020-11-20 PROCEDURE — 99385 PREV VISIT NEW AGE 18-39: CPT

## 2020-11-22 LAB
C TRACH RRNA SPEC QL NAA+PROBE: NOT DETECTED
N GONORRHOEA RRNA SPEC QL NAA+PROBE: NOT DETECTED
SOURCE TP AMPLIFICATION: NORMAL

## 2020-12-01 LAB — CYTOLOGY CVX/VAG DOC THIN PREP: NORMAL

## 2020-12-21 ENCOUNTER — APPOINTMENT (OUTPATIENT)
Dept: NEUROLOGY | Facility: CLINIC | Age: 20
End: 2020-12-21
Payer: COMMERCIAL

## 2020-12-21 VITALS
DIASTOLIC BLOOD PRESSURE: 75 MMHG | HEIGHT: 63 IN | SYSTOLIC BLOOD PRESSURE: 115 MMHG | BODY MASS INDEX: 38.09 KG/M2 | WEIGHT: 215 LBS

## 2020-12-21 PROBLEM — Z87.09 HISTORY OF ACUTE BRONCHITIS: Status: RESOLVED | Noted: 2019-03-11 | Resolved: 2020-12-21

## 2020-12-21 PROCEDURE — 99213 OFFICE O/P EST LOW 20 MIN: CPT

## 2020-12-21 PROCEDURE — 99072 ADDL SUPL MATRL&STAF TM PHE: CPT

## 2020-12-21 NOTE — HISTORY OF PRESENT ILLNESS
[FreeTextEntry1] : Initial office visit January 25, 2019:\par This is a 18-year-old woman who presents today for neurologic lesion. She's been getting episodes of dizziness a spinning as well as lightheadedness. It's worse with standing up. Occasionally she'll have the sensation that her vision is blacking out for a few seconds which resolves as she sits down. If she is working out when laying down, such as doing abdominal exercises, when she gets back up after finishing the exercise she may get a pain in the temple on the left which lasts for one to 2 minutes. It is not persists. It is not present during exercise. She saw, just yesterday who questioned if she had idiopathic intracranial hypertension. She does have a known diagnosis of orthostatic hypotension. She is here today for neurologic evaluation.\par \par Followup April 5, 2019:\par This is a 18-year-old woman returns today for followup of pseudotumor cerebri. Since her last visit she has had a spinal tap which showed elevated pressure. 2 post spinal headache and had an epidural blood patch which helped. She has seen Dr. Jordan again had residual papilledema and is now on Diamox 1000 mg in the morning and 500 mg in the afternoon. She has paresthesia in fatigue from this but is otherwise better with her headaches. She is here today for neurologic followup.\par \par Followup July 8, 2019:\par This is a 18-year-old woman who presents today for followup of pseudotumor cerebri. She's doing well in Diamox 1500 mg total daily. Her eye exam is within stable per her report she saw Dr. Bridges 2 weeks ago. She reports a papilledema and is improved. She tolerated the Diamox well and no longer having paresthesia. She no longer has post LP headaches. His CSF showed a contaminant, she has no active meningitis. She is here today for a neurologic followup.\par \par Followup January 10, 2020:\par This is a 19-year-old woman who presents today for followup of pseudotumor cerebri. She is doing well on Diamox 1500 mg total daily. She recently saw Dr. Jordan reports a stable I exam. She does not report headaches or vision loss. She is here today for a neurologic followup.\par \par Followup June 22, 2020:\par This is a 19-year-old woman who presents today for followup of pseudotumor cerebri. She is seen by video visit during the corona virus outbreak. Verbal consent was obtained at the time of the visit. She understood I would not be able to visualize her fundi during this visit. She stated that she recently saw Dr. Bridges from neuro ophthalmology. who said her discs looked as best as I have in a long time. He is not experiencing severe headaches, when she does she takes Tylenol and it's resolved. She is not having any visual loss. She presents today for video visit for routine followup.\par \par Followup December 21, 2020:\par This is a 20-year-old woman who presents today for followup of pseudotumor cerebri. She recently saw Dr. Bridges notes that her papilledema had worsened a bit. This was in the setting of her decreasing her Diamox one 1500 mg daily to 1000 mg daily. She has had to increase her Diamox to 1500 mg daily again. She's also gained some weight. She is otherwise doing well. She is here today for neurologic followup.\par

## 2020-12-21 NOTE — CONSULT LETTER
[Dear  ___] : Dear  [unfilled], [Courtesy Letter:] : I had the pleasure of seeing your patient, [unfilled], in my office today. [Please see my note below.] : Please see my note below. [Consult Closing:] : Thank you very much for allowing me to participate in the care of this patient.  If you have any questions, please do not hesitate to contact me. [Sincerely,] : Sincerely, [FreeTextEntry3] : Suleman Mercado M.D., Ph.D. DPN-N\par [facility]\par Neurology at [place]\par Medical Director of Stroke Services\par [facility]\par

## 2020-12-21 NOTE — PHYSICAL EXAM

## 2020-12-21 NOTE — ASSESSMENT
[FreeTextEntry1] : This is a 20-year-old woman with pseudotumor cerebri. She reports that Dr. Jordan from neuro-ophthalmology stated that her papilledema is a bit worse. This is likely due to her decreasing her medication as well as gaining weight. She states that it she's gone back up to 1500 mg a day of Diamox and that she has reconsidered herself to losing weight. I will see her back in 6 months, sooner should the need arise.

## 2021-02-17 NOTE — HISTORY OF PRESENT ILLNESS
[FreeTextEntry1] : 20 year old female presents for wwe.  She is a new patient to our practice.  Her last gyn exam was about 1 year ago.  She has no complaints today.  She has a history of hypermenorrhea and is taking tri-lo-raysa with good results.  Menarche was at the age of 10.  Prior to OCP's menses were q 14-21 days, lasting 7-14 days.  Since being on the pill menses are q 28 days, lasting 5 days.  She denies menorrhagia and dysmenorrhea.  She has no history of ovarian cysts, fibroids, endometriosis, STD's or abnormal pap's.  She is sexually active.  \par \par PMH is significant for pseudotumor cerebri (follows with neuro q 6 months).  She also has a history of depression and anxiety.  She saw a therapist in the past but has stopped.  She ha never had surgery.  She has a family history of breast cancer on her maternal side.  Her maternal grandmother had breast cancer at 76 and in BRCA negative.  She also has maternal great aunts that had breast cancer.  She denies tobacco and illicit drugs.  She does socially drink alcohol.  Gyn history as above.  Nulligravid.  She currently takes mvi, vitamin C, diamox and OCP's.

## 2021-02-17 NOTE — PLAN
[FreeTextEntry1] : 20 year old female wwe\par \par 1. Pap done with GC cultures\par 2. SBE reviewed\par 3. Rx tri-surinder-raysa x 1 year (+/- d/w patient)\par 4. RTO in 1 year for wwe

## 2021-02-23 ENCOUNTER — TRANSCRIPTION ENCOUNTER (OUTPATIENT)
Age: 21
End: 2021-02-23

## 2021-04-12 ENCOUNTER — LABORATORY RESULT (OUTPATIENT)
Age: 21
End: 2021-04-12

## 2021-06-21 ENCOUNTER — APPOINTMENT (OUTPATIENT)
Dept: NEUROLOGY | Facility: CLINIC | Age: 21
End: 2021-06-21

## 2021-07-01 DIAGNOSIS — J01.00 ACUTE MAXILLARY SINUSITIS, UNSPECIFIED: ICD-10-CM

## 2021-07-09 ENCOUNTER — TRANSCRIPTION ENCOUNTER (OUTPATIENT)
Age: 21
End: 2021-07-09

## 2021-10-18 NOTE — ED ADULT NURSE NOTE - CHIEF COMPLAINT
Alessia Abrams is a 24 year old female presenting to the Urgent Care today for 2-3 weeks of sinus congestion and headache. Reports worsening of symptoms on Thursday, including dizziness.    Also notes bilateral ears \"tingly\". Denies fever.    OTC use: Sudafed started yesterday. Neti-pot once yesterday and once this morning.    Allergies and Medications were reviewed and updated if necessary.    Pharmacy reviewed and updated to Patient's preference.    Patient would like communication of their results via:       Cell Phone:   Telephone Information:   Mobile [phone]     Okay to leave a message containing results? Yes    Writer in PPE: Gown, gloves, N95/level 3 mask, face shield during patient contact.     Pt in mask during rooming.    The patient is a 18y Female complaining of headache.

## 2021-11-24 ENCOUNTER — APPOINTMENT (OUTPATIENT)
Dept: OBGYN | Facility: CLINIC | Age: 21
End: 2021-11-24
Payer: COMMERCIAL

## 2021-11-24 VITALS
DIASTOLIC BLOOD PRESSURE: 78 MMHG | HEART RATE: 79 BPM | HEIGHT: 63 IN | BODY MASS INDEX: 34.2 KG/M2 | WEIGHT: 193 LBS | SYSTOLIC BLOOD PRESSURE: 111 MMHG

## 2021-11-24 PROCEDURE — 99395 PREV VISIT EST AGE 18-39: CPT

## 2021-11-24 NOTE — HISTORY OF PRESENT ILLNESS
[FreeTextEntry1] : 21 year old female presents for wwe.    She has no complaints today.  She has a history of hypermenorrhea and is taking tri-lo-raysa with good results.  Menarche was at the age of 10.  Prior to OCP's menses were q 14-21 days, lasting 7-14 days.  Since being on the pill menses are q 28 days, lasting 5 days.  She denies menorrhagia and dysmenorrhea.  She has no history of ovarian cysts, fibroids, endometriosis, STD's or abnormal pap's.  She is sexually active.  \par \par PMH is significant for pseudotumor cerebri (follows with neuro q 6 months).  She also has a history of depression and anxiety.  She saw a therapist in the past but has stopped.  She ha never had surgery.  She has a family history of breast cancer on her maternal side.  Her maternal grandmother had breast cancer at 76 and in BRCA negative.  She also has maternal great aunts that had breast cancer.  She denies tobacco and illicit drugs.  She does socially drink alcohol.  Gyn history as above.  Nulligravid.  She currently takes mvi, vitamin C, diamox and OCP's.     \par \par She had gardasil vac.

## 2021-12-08 LAB
C TRACH RRNA SPEC QL NAA+PROBE: NOT DETECTED
CYTOLOGY CVX/VAG DOC THIN PREP: NORMAL
N GONORRHOEA RRNA SPEC QL NAA+PROBE: NOT DETECTED
SOURCE TP AMPLIFICATION: NORMAL

## 2021-12-09 DIAGNOSIS — J45.901 UNSPECIFIED ASTHMA WITH (ACUTE) EXACERBATION: ICD-10-CM

## 2021-12-09 RX ORDER — ALBUTEROL SULFATE 90 UG/1
108 (90 BASE) INHALANT RESPIRATORY (INHALATION)
Qty: 1 | Refills: 2 | Status: ACTIVE | COMMUNITY
Start: 2021-12-09 | End: 1900-01-01

## 2022-02-09 DIAGNOSIS — L08.9 LOCAL INFECTION OF THE SKIN AND SUBCUTANEOUS TISSUE, UNSPECIFIED: ICD-10-CM

## 2022-02-25 ENCOUNTER — APPOINTMENT (OUTPATIENT)
Dept: PULMONOLOGY | Facility: CLINIC | Age: 22
End: 2022-02-25

## 2022-03-04 ENCOUNTER — APPOINTMENT (OUTPATIENT)
Dept: NEUROLOGY | Facility: CLINIC | Age: 22
End: 2022-03-04
Payer: COMMERCIAL

## 2022-03-04 PROCEDURE — 99213 OFFICE O/P EST LOW 20 MIN: CPT

## 2022-03-04 NOTE — PHYSICAL EXAM

## 2022-03-04 NOTE — CONSULT LETTER
[Dear  ___] : Dear  [unfilled], [Courtesy Letter:] : I had the pleasure of seeing your patient, [unfilled], in my office today. [Please see my note below.] : Please see my note below. [Consult Closing:] : Thank you very much for allowing me to participate in the care of this patient.  If you have any questions, please do not hesitate to contact me. [Sincerely,] : Sincerely, [FreeTextEntry3] : Suleman Mercado M.D., Ph.D. DPN-N\par [facility]\par Neurology at [place]\par Medical Director of Stroke Services\par [facility]\par

## 2022-03-04 NOTE — ASSESSMENT
[FreeTextEntry1] : This is a 21-year-old woman with pseudotumor cerebri.  She is currently stable on Diamox 500 mg twice a day.  At this point I would recommend continued follow-up with Dr. Bridges for close monitoring for papilledema.  She will call me immediately should she have any increased headache or vision issues.  I will see her back in the office in 6 months, sooner should the need arise.

## 2022-03-04 NOTE — HISTORY OF PRESENT ILLNESS
[FreeTextEntry1] : Initial office visit January 25, 2019:\par This is a 18-year-old woman who presents today for neurologic lesion. She's been getting episodes of dizziness a spinning as well as lightheadedness. It's worse with standing up. Occasionally she'll have the sensation that her vision is blacking out for a few seconds which resolves as she sits down. If she is working out when laying down, such as doing abdominal exercises, when she gets back up after finishing the exercise she may get a pain in the temple on the left which lasts for one to 2 minutes. It is not persists. It is not present during exercise. She saw, just yesterday who questioned if she had idiopathic intracranial hypertension. She does have a known diagnosis of orthostatic hypotension. She is here today for neurologic evaluation.\par \par Followup April 5, 2019:\par This is a 18-year-old woman returns today for followup of pseudotumor cerebri. Since her last visit she has had a spinal tap which showed elevated pressure. 2 post spinal headache and had an epidural blood patch which helped. She has seen Dr. Jordan again had residual papilledema and is now on Diamox 1000 mg in the morning and 500 mg in the afternoon. She has paresthesia in fatigue from this but is otherwise better with her headaches. She is here today for neurologic followup.\par \par Followup July 8, 2019:\par This is a 18-year-old woman who presents today for followup of pseudotumor cerebri. She's doing well in Diamox 1500 mg total daily. Her eye exam is within stable per her report she saw Dr. Bridges 2 weeks ago. She reports a papilledema and is improved. She tolerated the Diamox well and no longer having paresthesia. She no longer has post LP headaches. His CSF showed a contaminant, she has no active meningitis. She is here today for a neurologic followup.\par \par Followup January 10, 2020:\par This is a 19-year-old woman who presents today for followup of pseudotumor cerebri. She is doing well on Diamox 1500 mg total daily. She recently saw Dr. Jordan reports a stable I exam. She does not report headaches or vision loss. She is here today for a neurologic followup.\par \par Followup June 22, 2020:\par This is a 19-year-old woman who presents today for followup of pseudotumor cerebri. She is seen by video visit during the corona virus outbreak. Verbal consent was obtained at the time of the visit. She understood I would not be able to visualize her fundi during this visit. She stated that she recently saw Dr. Bridges from neuro ophthalmology. who said her discs looked as best as I have in a long time. He is not experiencing severe headaches, when she does she takes Tylenol and it's resolved. She is not having any visual loss. She presents today for video visit for routine followup.\par \par Followup December 21, 2020:\par This is a 20-year-old woman who presents today for followup of pseudotumor cerebri. She recently saw Dr. Bridges notes that her papilledema had worsened a bit. This was in the setting of her decreasing her Diamox one 1500 mg daily to 1000 mg daily. She has had to increase her Diamox to 1500 mg daily again. She's also gained some weight. She is otherwise doing well. She is here today for neurologic followup.\par \par Follow-up March 4, 2022:\par This is a 21-year-old woman who presents today for follow-up of pseudotumor cerebri.  She states that she saw Dr. Bridges about 2 weeks ago and that her papilledema is stable if not resolved.  She is currently taking Diamox 500 mg twice a day.  She tolerates this dose well.  She is not experiencing headache or vision loss.  She is here today for neurologic follow-up.\par

## 2022-06-18 ENCOUNTER — NON-APPOINTMENT (OUTPATIENT)
Age: 22
End: 2022-06-18

## 2022-06-21 ENCOUNTER — APPOINTMENT (OUTPATIENT)
Dept: FAMILY MEDICINE | Facility: CLINIC | Age: 22
End: 2022-06-21

## 2022-06-21 DIAGNOSIS — J45.909 UNSPECIFIED ASTHMA, UNCOMPLICATED: ICD-10-CM

## 2022-06-21 RX ORDER — HYDROCODONE BITARTRATE AND HOMATROPINE METHYLBROMIDE 1.5; 5 MG/5ML; MG/5ML
5-1.5 SOLUTION ORAL EVERY 6 HOURS
Qty: 60 | Refills: 0 | Status: DISCONTINUED | COMMUNITY
Start: 2022-06-21 | End: 2022-06-21

## 2022-08-03 ENCOUNTER — APPOINTMENT (OUTPATIENT)
Dept: FAMILY MEDICINE | Facility: CLINIC | Age: 22
End: 2022-08-03

## 2022-09-14 ENCOUNTER — APPOINTMENT (OUTPATIENT)
Dept: NEUROLOGY | Facility: CLINIC | Age: 22
End: 2022-09-14

## 2022-09-14 PROCEDURE — 99213 OFFICE O/P EST LOW 20 MIN: CPT

## 2022-09-14 NOTE — CONSULT LETTER
[Dear  ___] : Dear  [unfilled], [Consult Letter:] : I had the pleasure of evaluating your patient, [unfilled]. [Please see my note below.] : Please see my note below. [Consult Closing:] : Thank you very much for allowing me to participate in the care of this patient.  If you have any questions, please do not hesitate to contact me. [Sincerely,] : Sincerely, [FreeTextEntry3] : Suleman Mercado M.D., Ph.D. DPN-N\par [facility]\par Neurology at [place]\par Medical Director of Stroke Services\par [facility]\par

## 2022-09-14 NOTE — ASSESSMENT
[FreeTextEntry1] : This is a 22-year-old woman with stable pseudotumor cerebri.  At this point she will continue Diamox 500 mg twice a day.  I will see her back in 6 months, sooner should the need arise.  I asked her to call me in the interim with any problems, questions or concerns.

## 2022-09-14 NOTE — HISTORY OF PRESENT ILLNESS
[FreeTextEntry1] : Initial office visit January 25, 2019:\par This is a 18-year-old woman who presents today for neurologic lesion. She's been getting episodes of dizziness a spinning as well as lightheadedness. It's worse with standing up. Occasionally she'll have the sensation that her vision is blacking out for a few seconds which resolves as she sits down. If she is working out when laying down, such as doing abdominal exercises, when she gets back up after finishing the exercise she may get a pain in the temple on the left which lasts for one to 2 minutes. It is not persists. It is not present during exercise. She saw, just yesterday who questioned if she had idiopathic intracranial hypertension. She does have a known diagnosis of orthostatic hypotension. She is here today for neurologic evaluation.\par \par Followup April 5, 2019:\par This is a 18-year-old woman returns today for followup of pseudotumor cerebri. Since her last visit she has had a spinal tap which showed elevated pressure. 2 post spinal headache and had an epidural blood patch which helped. She has seen Dr. Jordan again had residual papilledema and is now on Diamox 1000 mg in the morning and 500 mg in the afternoon. She has paresthesia in fatigue from this but is otherwise better with her headaches. She is here today for neurologic followup.\par \par Followup July 8, 2019:\par This is a 18-year-old woman who presents today for followup of pseudotumor cerebri. She's doing well in Diamox 1500 mg total daily. Her eye exam is within stable per her report she saw Dr. Bridges 2 weeks ago. She reports a papilledema and is improved. She tolerated the Diamox well and no longer having paresthesia. She no longer has post LP headaches. His CSF showed a contaminant, she has no active meningitis. She is here today for a neurologic followup.\par \par Followup January 10, 2020:\par This is a 19-year-old woman who presents today for followup of pseudotumor cerebri. She is doing well on Diamox 1500 mg total daily. She recently saw Dr. Jordan reports a stable I exam. She does not report headaches or vision loss. She is here today for a neurologic followup.\par \par Followup June 22, 2020:\par This is a 19-year-old woman who presents today for followup of pseudotumor cerebri. She is seen by video visit during the corona virus outbreak. Verbal consent was obtained at the time of the visit. She understood I would not be able to visualize her fundi during this visit. She stated that she recently saw Dr. Bridges from neuro ophthalmology. who said her discs looked as best as I have in a long time. He is not experiencing severe headaches, when she does she takes Tylenol and it's resolved. She is not having any visual loss. She presents today for video visit for routine followup.\par \par Followup December 21, 2020:\par This is a 20-year-old woman who presents today for followup of pseudotumor cerebri. She recently saw Dr. Bridges notes that her papilledema had worsened a bit. This was in the setting of her decreasing her Diamox one 1500 mg daily to 1000 mg daily. She has had to increase her Diamox to 1500 mg daily again. She's also gained some weight. She is otherwise doing well. She is here today for neurologic followup.\par \par Follow-up March 4, 2022:\par This is a 21-year-old woman who presents today for follow-up of pseudotumor cerebri.  She states that she saw Dr. Bridges about 2 weeks ago and that her papilledema is stable if not resolved.  She is currently taking Diamox 500 mg twice a day.  She tolerates this dose well.  She is not experiencing headache or vision loss.  She is here today for neurologic follow-up.\par \par Follow-up September 14, 2022:\par This is a 22-year-old woman who presents today for follow-up of pseudotumor cerebri.  She saw neuro-ophthalmology over the summer.  She states everything was okay.  She is not have any visual symptoms or headaches.  She continues on Diamox 500 mg twice a day.  She is tolerating this well other than some mild tingling in her feet.  She is here today for neurologic follow-up.\par

## 2022-10-31 ENCOUNTER — APPOINTMENT (OUTPATIENT)
Dept: FAMILY MEDICINE | Facility: CLINIC | Age: 22
End: 2022-10-31

## 2022-10-31 VITALS
RESPIRATION RATE: 16 BRPM | HEART RATE: 82 BPM | TEMPERATURE: 98 F | SYSTOLIC BLOOD PRESSURE: 122 MMHG | OXYGEN SATURATION: 98 % | DIASTOLIC BLOOD PRESSURE: 78 MMHG | BODY MASS INDEX: 38.27 KG/M2 | HEIGHT: 63 IN | WEIGHT: 216 LBS

## 2022-10-31 PROCEDURE — 99385 PREV VISIT NEW AGE 18-39: CPT | Mod: 25

## 2022-10-31 PROCEDURE — 36415 COLL VENOUS BLD VENIPUNCTURE: CPT

## 2022-10-31 RX ORDER — CEPHALEXIN 500 MG/1
500 TABLET ORAL 3 TIMES DAILY
Qty: 21 | Refills: 0 | Status: DISCONTINUED | COMMUNITY
Start: 2022-02-09 | End: 2022-10-31

## 2022-10-31 RX ORDER — HYDROCODONE BITARTRATE AND HOMATROPINE METHYLBROMIDE 5; 1.5 MG/5ML; MG/5ML
5-1.5 SYRUP ORAL
Qty: 120 | Refills: 0 | Status: DISCONTINUED | COMMUNITY
Start: 2020-02-27 | End: 2022-10-31

## 2022-10-31 RX ORDER — NORGESTIMATE AND ETHINYL ESTRADIOL 7DAYSX3 LO
0.18/0.215/0.25 KIT ORAL
Qty: 3 | Refills: 3 | Status: DISCONTINUED | COMMUNITY
Start: 2019-11-18 | End: 2022-10-31

## 2022-10-31 RX ORDER — NORGESTIMATE AND ETHINYL ESTRADIOL 7DAYSX3 LO
0.18/0.215/0.25 KIT ORAL DAILY
Qty: 84 | Refills: 0 | Status: DISCONTINUED | COMMUNITY
Start: 2019-10-02 | End: 2022-10-31

## 2022-10-31 RX ORDER — HYDROCODONE BITARTRATE AND HOMATROPINE METHYLBROMIDE 1.5; 5 MG/5ML; MG/5ML
5-1.5 SOLUTION ORAL EVERY 6 HOURS
Qty: 60 | Refills: 0 | Status: DISCONTINUED | COMMUNITY
Start: 2022-06-21 | End: 2022-10-31

## 2022-10-31 RX ORDER — AMOXICILLIN AND CLAVULANATE POTASSIUM 875; 125 MG/1; MG/1
875-125 TABLET, COATED ORAL
Qty: 20 | Refills: 0 | Status: DISCONTINUED | COMMUNITY
Start: 2022-06-21 | End: 2022-10-31

## 2022-10-31 RX ORDER — AMOXICILLIN AND CLAVULANATE POTASSIUM 875; 125 MG/1; MG/1
875-125 TABLET, COATED ORAL
Qty: 14 | Refills: 0 | Status: DISCONTINUED | COMMUNITY
Start: 2021-07-01 | End: 2022-10-31

## 2022-10-31 NOTE — HISTORY OF PRESENT ILLNESS
[FreeTextEntry1] : Establish PCP [de-identified] : This is a 22 year-old F who presents today to establish PCP.\par Medical hx significant for pseudotumor cerebri, Idiopathic Intracranial HTN. She is doing well on Diamox 1500 mg total daily. \par Seen by neurology Dr Mercado and Dr. Jordan. 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

## 2022-10-31 NOTE — ASSESSMENT
[FreeTextEntry1] : 23 y/o F establishing PCP:\par \par -Blood and UA today\par -HIV/ HC screening\par \par # Gyn: up to date with Dr Alvarado\par \par # Hx Pseudotumor cerebri/ Intracranial HTN;\par -On diamox doing well.\par -F/up with Dr Mercado and Dr Murray\par \par # gral Anxiety Disorder:\par -Start Sertraline.\par -meds risks and benefits d/w pt\par -Advise counseling\par \par # Obesity:\par -TLC advise.\par

## 2022-10-31 NOTE — HEALTH RISK ASSESSMENT
[Good] : ~his/her~  mood as  good [Never] : Never [Yes] : In the past 12 months have you used drugs other than those required for medical reasons? Yes [No falls in past year] : Patient reported no falls in the past year [0] : 2) Feeling down, depressed, or hopeless: Not at all (0) [PHQ-2 Negative - No further assessment needed] : PHQ-2 Negative - No further assessment needed [Patient reported PAP Smear was normal] : Patient reported PAP Smear was normal [HIV Test offered] : HIV Test offered [Hepatitis C test offered] : Hepatitis C test offered [None] : None [With Significant Other] : lives with significant other [Employed] : employed [Significant Other] : lives with significant other [Sexually Active] : sexually active [Feels Safe at Home] : Feels safe at home [Fully functional (bathing, dressing, toileting, transferring, walking, feeding)] : Fully functional (bathing, dressing, toileting, transferring, walking, feeding) [Fully functional (using the telephone, shopping, preparing meals, housekeeping, doing laundry, using] : Fully functional and needs no help or supervision to perform IADLs (using the telephone, shopping, preparing meals, housekeeping, doing laundry, using transportation, managing medications and managing finances) [Smoke Detector] : smoke detector [Safety elements used in home] : safety elements used in home [Seat Belt] :  uses seat belt [With Patient/Caregiver] : , with patient/caregiver [Designated Healthcare Proxy] : Designated healthcare proxy [Name: ___] : Health Care Proxy's Name: [unfilled]  [Relationship: ___] : Relationship: [unfilled] [de-identified] : social [de-identified] : marihuana daily [de-identified] : walks and gym [de-identified] : no [[id number]] : 0 [Reports changes in hearing] : Reports no changes in hearing [Reports changes in vision] : Reports no changes in vision [Reports changes in dental health] : Reports no changes in dental health [TB Exposure] : is not being exposed to tuberculosis [PapSmearDate] : 11/21 [PapSmearComments] : Dr HERRERA [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

## 2022-11-02 LAB
ALBUMIN SERPL ELPH-MCNC: 4.1 G/DL
ALP BLD-CCNC: 74 U/L
ALT SERPL-CCNC: 14 U/L
ANION GAP SERPL CALC-SCNC: 16 MMOL/L
AST SERPL-CCNC: 15 U/L
BASOPHILS # BLD AUTO: 0.08 K/UL
BASOPHILS NFR BLD AUTO: 1 %
BILIRUB SERPL-MCNC: 0.3 MG/DL
BUN SERPL-MCNC: 14 MG/DL
CALCIUM SERPL-MCNC: 9.1 MG/DL
CHLORIDE SERPL-SCNC: 105 MMOL/L
CHOLEST SERPL-MCNC: 182 MG/DL
CO2 SERPL-SCNC: 18 MMOL/L
CREAT SERPL-MCNC: 0.65 MG/DL
EGFR: 127 ML/MIN/1.73M2
EOSINOPHIL # BLD AUTO: 0.35 K/UL
EOSINOPHIL NFR BLD AUTO: 4.5 %
GLUCOSE SERPL-MCNC: 86 MG/DL
HCT VFR BLD CALC: 45.6 %
HCV AB SER QL: NONREACTIVE
HCV S/CO RATIO: 0.15 S/CO
HDLC SERPL-MCNC: 67 MG/DL
HGB BLD-MCNC: 14 G/DL
HIV1+2 AB SPEC QL IA.RAPID: NONREACTIVE
IMM GRANULOCYTES NFR BLD AUTO: 0.4 %
LDLC SERPL CALC-MCNC: 91 MG/DL
LYMPHOCYTES # BLD AUTO: 2.35 K/UL
LYMPHOCYTES NFR BLD AUTO: 30 %
MAN DIFF?: NORMAL
MCHC RBC-ENTMCNC: 27.6 PG
MCHC RBC-ENTMCNC: 30.7 GM/DL
MCV RBC AUTO: 89.9 FL
MONOCYTES # BLD AUTO: 0.47 K/UL
MONOCYTES NFR BLD AUTO: 6 %
NEUTROPHILS # BLD AUTO: 4.56 K/UL
NEUTROPHILS NFR BLD AUTO: 58.1 %
NONHDLC SERPL-MCNC: 115 MG/DL
PLATELET # BLD AUTO: 415 K/UL
POTASSIUM SERPL-SCNC: 4.1 MMOL/L
PROT SERPL-MCNC: 6.6 G/DL
RBC # BLD: 5.07 M/UL
RBC # FLD: 12.9 %
SODIUM SERPL-SCNC: 140 MMOL/L
TRIGL SERPL-MCNC: 121 MG/DL
TSH SERPL-ACNC: 1.9 UIU/ML
WBC # FLD AUTO: 7.84 K/UL

## 2022-11-03 ENCOUNTER — TRANSCRIPTION ENCOUNTER (OUTPATIENT)
Age: 22
End: 2022-11-03

## 2022-11-04 ENCOUNTER — NON-APPOINTMENT (OUTPATIENT)
Age: 22
End: 2022-11-04

## 2022-12-06 ENCOUNTER — APPOINTMENT (OUTPATIENT)
Dept: FAMILY MEDICINE | Facility: CLINIC | Age: 22
End: 2022-12-06

## 2022-12-06 VITALS
DIASTOLIC BLOOD PRESSURE: 80 MMHG | RESPIRATION RATE: 12 BRPM | HEART RATE: 84 BPM | HEIGHT: 63 IN | BODY MASS INDEX: 38.27 KG/M2 | SYSTOLIC BLOOD PRESSURE: 110 MMHG | WEIGHT: 216 LBS | OXYGEN SATURATION: 98 % | TEMPERATURE: 97.4 F

## 2022-12-06 PROCEDURE — 99213 OFFICE O/P EST LOW 20 MIN: CPT

## 2022-12-06 NOTE — HISTORY OF PRESENT ILLNESS
[FreeTextEntry1] : F/up on Anxiety [de-identified] : This is a 22 year-old F here for f/up after started sertraline for anxiety..\par Medical hx significant for pseudotumor cerebri, Idiopathic Intracranial HTN. She is doing well on Diamox 1500 mg total daily. \par Seen by neurology Dr Mercado and Dr. Jordan. 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

## 2022-12-06 NOTE — ASSESSMENT
[FreeTextEntry1] : \par HCM\par -10/31/22\par -HIV/ HC screening\par \par # Gyn: up to date with Dr Alvarado\par \par # Hx Pseudotumor cerebri/ Intracranial HTN;\par -On diamox doing well.\par -F/up with Dr Mercado and Dr Murray\par \par # gral Anxiety Disorder:\par -Doing well on Sertraline 25 mg daily\par -meds risks and benefits d/w pt\par -Advise counseling\par \par # Obesity:\par -TLC advise.\par  \par

## 2022-12-29 ENCOUNTER — APPOINTMENT (OUTPATIENT)
Dept: OBGYN | Facility: CLINIC | Age: 22
End: 2022-12-29

## 2022-12-29 ENCOUNTER — NON-APPOINTMENT (OUTPATIENT)
Age: 22
End: 2022-12-29

## 2022-12-29 VITALS
WEIGHT: 220 LBS | HEIGHT: 63 IN | BODY MASS INDEX: 38.98 KG/M2 | DIASTOLIC BLOOD PRESSURE: 80 MMHG | SYSTOLIC BLOOD PRESSURE: 110 MMHG

## 2022-12-29 LAB
ALBUMIN SERPL ELPH-MCNC: 4.4 G/DL
ALP BLD-CCNC: 59 U/L
ALT SERPL-CCNC: 13 U/L
ANION GAP SERPL CALC-SCNC: 12 MMOL/L
AST SERPL-CCNC: 19 U/L
BASOPHILS # BLD AUTO: 0.03 K/UL
BASOPHILS NFR BLD AUTO: 0.4 %
BILIRUB SERPL-MCNC: 0.3 MG/DL
BUN SERPL-MCNC: 11 MG/DL
CALCIUM SERPL-MCNC: 9.5 MG/DL
CHLORIDE SERPL-SCNC: 109 MMOL/L
CHOLEST SERPL-MCNC: 184 MG/DL
CO2 SERPL-SCNC: 19 MMOL/L
CREAT SERPL-MCNC: 0.81 MG/DL
EOSINOPHIL # BLD AUTO: 0.3 K/UL
EOSINOPHIL NFR BLD AUTO: 4.1 %
ESTIMATED AVERAGE GLUCOSE: 103 MG/DL
GLUCOSE SERPL-MCNC: 100 MG/DL
HBA1C MFR BLD HPLC: 5.2 %
HCT VFR BLD CALC: 45 %
HDLC SERPL-MCNC: 54 MG/DL
HGB BLD-MCNC: 14.1 G/DL
IMM GRANULOCYTES NFR BLD AUTO: 0.3 %
LDLC SERPL CALC-MCNC: 106 MG/DL
LYMPHOCYTES # BLD AUTO: 2.93 K/UL
LYMPHOCYTES NFR BLD AUTO: 40.2 %
MAN DIFF?: NORMAL
MCHC RBC-ENTMCNC: 27.9 PG
MCHC RBC-ENTMCNC: 31.3 GM/DL
MCV RBC AUTO: 89.1 FL
MONOCYTES # BLD AUTO: 0.46 K/UL
MONOCYTES NFR BLD AUTO: 6.3 %
NEUTROPHILS # BLD AUTO: 3.54 K/UL
NEUTROPHILS NFR BLD AUTO: 48.7 %
NONHDLC SERPL-MCNC: 130 MG/DL
PLATELET # BLD AUTO: 318 K/UL
POTASSIUM SERPL-SCNC: 3.7 MMOL/L
PROT SERPL-MCNC: 6.8 G/DL
RBC # BLD: 5.05 M/UL
RBC # FLD: 12.9 %
SODIUM SERPL-SCNC: 140 MMOL/L
T3RU NFR SERPL: 1.2 TBI
T4 SERPL-MCNC: 7.5 UG/DL
TRIGL SERPL-MCNC: 120 MG/DL
TSH SERPL-ACNC: 2.41 UIU/ML
WBC # FLD AUTO: 7.28 K/UL

## 2022-12-29 PROCEDURE — 99395 PREV VISIT EST AGE 18-39: CPT

## 2022-12-29 NOTE — HISTORY OF PRESENT ILLNESS
[FreeTextEntry1] : 22 year old female presents for wwe.  She has no complaints today.  She has a history of hypermenorrhea and is taking tri-lo-raysa with good results.  Menarche was at the age of 10.  Prior to OCP's menses were q 14-21 days, lasting 7-14 days.  Since being on the pill menses are q 28 days, lasting 5 days.  She denies menorrhagia and dysmenorrhea.  She has no history of ovarian cysts, fibroids, endometriosis, STD's or abnormal pap's.  She is sexually active.  \par \par PMH is significant for pseudotumor cerebri (follows with neuro q 6 months).  She also has a history of depression and anxiety.  She has never had surgery.  She has a family history of breast cancer on her maternal side.  Her maternal grandmother had breast cancer at 76 and in BRCA negative.  She also has maternal great aunts that had breast cancer.  She denies tobacco and illicit drugs.  She does socially drink alcohol.  Gyn history as above.  Nulligravid.  She currently takes zoloft, mvi, vitamin C, diamox and OCP's.

## 2022-12-29 NOTE — PLAN
[FreeTextEntry1] : 22 year old female wwe\par \par 1. Pap done with GC cultures\par 2. SBE rteaching done\par 3. Rx tri-surinder-raysa x 1 year (+/- d/w patient)\par 4. RTO in 1 year for wwe

## 2023-01-09 LAB — CYTOLOGY CVX/VAG DOC THIN PREP: NORMAL

## 2023-02-03 ENCOUNTER — APPOINTMENT (OUTPATIENT)
Dept: FAMILY MEDICINE | Facility: CLINIC | Age: 23
End: 2023-02-03
Payer: COMMERCIAL

## 2023-02-03 VITALS
HEART RATE: 80 BPM | WEIGHT: 222 LBS | DIASTOLIC BLOOD PRESSURE: 72 MMHG | BODY MASS INDEX: 39.34 KG/M2 | SYSTOLIC BLOOD PRESSURE: 108 MMHG | HEIGHT: 63 IN | OXYGEN SATURATION: 98 % | TEMPERATURE: 97.9 F | RESPIRATION RATE: 14 BRPM

## 2023-02-03 DIAGNOSIS — J34.89 OTHER SPECIFIED DISORDERS OF NOSE AND NASAL SINUSES: ICD-10-CM

## 2023-02-03 DIAGNOSIS — J02.9 ACUTE PHARYNGITIS, UNSPECIFIED: ICD-10-CM

## 2023-02-03 DIAGNOSIS — U07.1 COVID-19: ICD-10-CM

## 2023-02-03 PROCEDURE — 87804 INFLUENZA ASSAY W/OPTIC: CPT | Mod: QW

## 2023-02-03 PROCEDURE — 87635 SARS-COV-2 COVID-19 AMP PRB: CPT

## 2023-02-03 PROCEDURE — 99213 OFFICE O/P EST LOW 20 MIN: CPT | Mod: 25

## 2023-02-12 NOTE — PHYSICAL EXAM
[Normal] : soft, non-tender, non-distended, no masses palpated, no HSM and normal bowel sounds [de-identified] : Nares boggy and erythematous, posterior pharynx slightly erythematous.

## 2023-02-12 NOTE — HISTORY OF PRESENT ILLNESS
[FreeTextEntry8] : Pt presents c/o runny stuffy nose and sore throat for the pat 4 days, denies any fever, no sick contacts at home

## 2023-02-12 NOTE — ASSESSMENT
[FreeTextEntry1] : 23 y/o female with sore throat and stuffy nose\par -Cold / Flu like symptoms\par -POCT Raid Flu negative\par -POCT Abbott rapid Covid 19 positive\par -Start Benzonatate q8h prn\par -OTC Acetaminophen / Ibuprofen for symptom control\par -No need for Paxlovid

## 2023-02-12 NOTE — REVIEW OF SYSTEMS
[Fever] : no fever [Chills] : no chills [Earache] : no earache [Hoarseness] : no hoarseness [Nasal Discharge] : nasal discharge [Sore Throat] : sore throat [Postnasal Drip] : postnasal drip [Cough] : cough [Negative] : Integumentary [FreeTextEntry6] : Dry cough

## 2023-03-22 ENCOUNTER — RX RENEWAL (OUTPATIENT)
Age: 23
End: 2023-03-22

## 2023-03-23 ENCOUNTER — RX RENEWAL (OUTPATIENT)
Age: 23
End: 2023-03-23

## 2023-03-23 ENCOUNTER — APPOINTMENT (OUTPATIENT)
Dept: NEUROLOGY | Facility: CLINIC | Age: 23
End: 2023-03-23
Payer: COMMERCIAL

## 2023-03-23 VITALS
BODY MASS INDEX: 38.98 KG/M2 | DIASTOLIC BLOOD PRESSURE: 80 MMHG | SYSTOLIC BLOOD PRESSURE: 110 MMHG | WEIGHT: 220 LBS | HEIGHT: 63 IN

## 2023-03-23 LAB — SARS-COV-2 RDRP RESP QL NAA+PROBE: POSITIVE

## 2023-03-23 PROCEDURE — 99213 OFFICE O/P EST LOW 20 MIN: CPT

## 2023-03-23 NOTE — HISTORY OF PRESENT ILLNESS
[FreeTextEntry1] : Initial office visit January 25, 2019:\par This is a 18-year-old woman who presents today for neurologic lesion. She's been getting episodes of dizziness a spinning as well as lightheadedness. It's worse with standing up. Occasionally she'll have the sensation that her vision is blacking out for a few seconds which resolves as she sits down. If she is working out when laying down, such as doing abdominal exercises, when she gets back up after finishing the exercise she may get a pain in the temple on the left which lasts for one to 2 minutes. It is not persists. It is not present during exercise. She saw, just yesterday who questioned if she had idiopathic intracranial hypertension. She does have a known diagnosis of orthostatic hypotension. She is here today for neurologic evaluation.\par \par Followup April 5, 2019:\par This is a 18-year-old woman returns today for followup of pseudotumor cerebri. Since her last visit she has had a spinal tap which showed elevated pressure. 2 post spinal headache and had an epidural blood patch which helped. She has seen Dr. Jordan again had residual papilledema and is now on Diamox 1000 mg in the morning and 500 mg in the afternoon. She has paresthesia in fatigue from this but is otherwise better with her headaches. She is here today for neurologic followup.\par \par Followup July 8, 2019:\par This is a 18-year-old woman who presents today for followup of pseudotumor cerebri. She's doing well in Diamox 1500 mg total daily. Her eye exam is within stable per her report she saw Dr. Bridges 2 weeks ago. She reports a papilledema and is improved. She tolerated the Diamox well and no longer having paresthesia. She no longer has post LP headaches. His CSF showed a contaminant, she has no active meningitis. She is here today for a neurologic followup.\par \par Followup January 10, 2020:\par This is a 19-year-old woman who presents today for followup of pseudotumor cerebri. She is doing well on Diamox 1500 mg total daily. She recently saw Dr. Jordan reports a stable I exam. She does not report headaches or vision loss. She is here today for a neurologic followup.\par \par Followup June 22, 2020:\par This is a 19-year-old woman who presents today for followup of pseudotumor cerebri. She is seen by video visit during the corona virus outbreak. Verbal consent was obtained at the time of the visit. She understood I would not be able to visualize her fundi during this visit. She stated that she recently saw Dr. Bridges from neuro ophthalmology. who said her discs looked as best as I have in a long time. He is not experiencing severe headaches, when she does she takes Tylenol and it's resolved. She is not having any visual loss. She presents today for video visit for routine followup.\par \par Followup December 21, 2020:\par This is a 20-year-old woman who presents today for followup of pseudotumor cerebri. She recently saw Dr. Bridges notes that her papilledema had worsened a bit. This was in the setting of her decreasing her Diamox one 1500 mg daily to 1000 mg daily. She has had to increase her Diamox to 1500 mg daily again. She's also gained some weight. She is otherwise doing well. She is here today for neurologic followup.\par \par Follow-up March 4, 2022:\par This is a 21-year-old woman who presents today for follow-up of pseudotumor cerebri.  She states that she saw Dr. Bridges about 2 weeks ago and that her papilledema is stable if not resolved.  She is currently taking Diamox 500 mg twice a day.  She tolerates this dose well.  She is not experiencing headache or vision loss.  She is here today for neurologic follow-up.\par \par Follow-up September 14, 2022:\par This is a 22-year-old woman who presents today for follow-up of pseudotumor cerebri.  She saw neuro-ophthalmology over the summer.  She states everything was okay.  She is not have any visual symptoms or headaches.  She continues on Diamox 500 mg twice a day.  She is tolerating this well other than some mild tingling in her feet.  She is here today for neurologic follow-up.\par \par Follow-up March 23, 2023:\par This is a 22-year-old woman who presents today for  pseudotumor cerebri.  She is doing fairly stable on Diamox 500 mg twice a day.  She gets occasional images of stars but no vision loss or significant headache.  This of vision appearance of stars lasts briefly.  She has not seen ophthalmology since November.  She is here today for follow-up.\par

## 2023-03-23 NOTE — ASSESSMENT
[FreeTextEntry1] : This is a 22-year-old woman with pseudotumor cerebri stable on Diamox 500 mg twice a day.  I will send in a renewal for her.  I will contact neuro-ophthalmology to see if they could reevaluate her.  I do not appreciate papilledema on exam today however.  I will see her back in 6 months, sooner should the need arise.\par

## 2023-03-23 NOTE — CONSULT LETTER
[Dear  ___] : Dear  [unfilled], [Courtesy Letter:] : I had the pleasure of seeing your patient, [unfilled], in my office today. [Please see my note below.] : Please see my note below. [Consult Closing:] : Thank you very much for allowing me to participate in the care of this patient.  If you have any questions, please do not hesitate to contact me. [Sincerely,] : Sincerely, [FreeTextEntry3] : Suleman Mercado M.D., Ph.D. DPN-N\par [facility]\par Neurology at [place]\par Medical Director of Stroke Services\par [facility]\par

## 2023-05-31 ENCOUNTER — OFFICE (OUTPATIENT)
Dept: URBAN - METROPOLITAN AREA CLINIC 115 | Facility: CLINIC | Age: 23
Setting detail: OPHTHALMOLOGY
End: 2023-05-31
Payer: COMMERCIAL

## 2023-05-31 DIAGNOSIS — H47.11: ICD-10-CM

## 2023-05-31 DIAGNOSIS — H53.431: ICD-10-CM

## 2023-05-31 DIAGNOSIS — G93.2: ICD-10-CM

## 2023-05-31 PROCEDURE — 92083 EXTENDED VISUAL FIELD XM: CPT | Performed by: OPHTHALMOLOGY

## 2023-05-31 PROCEDURE — 92133 CPTRZD OPH DX IMG PST SGM ON: CPT | Performed by: OPHTHALMOLOGY

## 2023-05-31 PROCEDURE — 92014 COMPRE OPH EXAM EST PT 1/>: CPT | Performed by: OPHTHALMOLOGY

## 2023-05-31 ASSESSMENT — REFRACTION_AUTOREFRACTION
OD_SPHERE: -1.25
OS_SPHERE: -0.50
OD_CYLINDER: -0.25
OS_AXIS: 002
OD_AXIS: 006
OS_CYLINDER: -0.50

## 2023-05-31 ASSESSMENT — REFRACTION_CURRENTRX
OD_AXIS: 180
OS_VPRISM_DIRECTION: SV
OD_CYLINDER: 0.00
OS_OVR_VA: 20/
OS_AXIS: 180
OD_SPHERE: -1.25
OS_CYLINDER: 0.00
OD_OVR_VA: 20/
OS_SPHERE: -0.50
OD_VPRISM_DIRECTION: SV

## 2023-05-31 ASSESSMENT — TONOMETRY
OS_IOP_MMHG: 16
OD_IOP_MMHG: 17

## 2023-05-31 ASSESSMENT — CONFRONTATIONAL VISUAL FIELD TEST (CVF)
OD_FINDINGS: FULL
OS_FINDINGS: FULL

## 2023-05-31 ASSESSMENT — SPHEQUIV_DERIVED
OD_SPHEQUIV: -1.375
OS_SPHEQUIV: -0.75

## 2023-05-31 ASSESSMENT — VISUAL ACUITY
OD_BCVA: 20/20
OS_BCVA: 20/20

## 2023-06-07 ENCOUNTER — APPOINTMENT (OUTPATIENT)
Dept: FAMILY MEDICINE | Facility: CLINIC | Age: 23
End: 2023-06-07
Payer: COMMERCIAL

## 2023-06-07 VITALS
DIASTOLIC BLOOD PRESSURE: 62 MMHG | BODY MASS INDEX: 39.69 KG/M2 | HEIGHT: 63 IN | RESPIRATION RATE: 14 BRPM | OXYGEN SATURATION: 99 % | WEIGHT: 224 LBS | SYSTOLIC BLOOD PRESSURE: 110 MMHG | TEMPERATURE: 98 F | HEART RATE: 66 BPM

## 2023-06-07 DIAGNOSIS — F41.1 GENERALIZED ANXIETY DISORDER: ICD-10-CM

## 2023-06-07 DIAGNOSIS — F41.9 ANXIETY DISORDER, UNSPECIFIED: ICD-10-CM

## 2023-06-07 PROCEDURE — 36415 COLL VENOUS BLD VENIPUNCTURE: CPT

## 2023-06-07 PROCEDURE — 99214 OFFICE O/P EST MOD 30 MIN: CPT | Mod: 25

## 2023-06-07 RX ORDER — BENZONATATE 200 MG/1
200 CAPSULE ORAL 3 TIMES DAILY
Qty: 30 | Refills: 1 | Status: DISCONTINUED | COMMUNITY
Start: 2023-02-03 | End: 2023-06-07

## 2023-06-07 NOTE — COUNSELING
[Fall prevention counseling provided] : Fall prevention counseling provided [Adequate lighting] : Adequate lighting [Behavioral health counseling provided] : Behavioral health counseling provided [Potential consequences of obesity discussed] : Potential consequences of obesity discussed [Benefits of weight loss discussed] : Benefits of weight loss discussed [Encouraged to maintain food diary] : Encouraged to maintain food diary [Encouraged to increase physical activity] : Encouraged to increase physical activity [Target Wt Loss Goal ___] : Weight Loss Goals: Target weight loss goal [unfilled] lbs [Weigh Self Weekly] : weigh self weekly [Decrease Portions] : decrease portions [____ min/wk Activity] : [unfilled] min/wk activity [None] : None [Good understanding] : Patient has a good understanding of lifestyle changes and steps needed to achieve self management goal [FreeTextEntry4] : 15

## 2023-06-07 NOTE — HISTORY OF PRESENT ILLNESS
[FreeTextEntry1] : F/up on Anxiety\par Med refill [de-identified] : This is a 22 year-old F here for f/up.\par She is on sertraline for anxiety..\par Medical hx significant for pseudotumor cerebri, Idiopathic Intracranial HTN. She is doing well on Diamox 1500 mg total daily. \par Seen by neurology Dr Mercado and Dr. Jordan. 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

## 2023-06-08 ENCOUNTER — NON-APPOINTMENT (OUTPATIENT)
Age: 23
End: 2023-06-08

## 2023-06-08 LAB
ANION GAP SERPL CALC-SCNC: 15 MMOL/L
BUN SERPL-MCNC: 14 MG/DL
CALCIUM SERPL-MCNC: 9.2 MG/DL
CHLORIDE SERPL-SCNC: 108 MMOL/L
CO2 SERPL-SCNC: 18 MMOL/L
CREAT SERPL-MCNC: 0.79 MG/DL
EGFR: 108 ML/MIN/1.73M2
GLUCOSE SERPL-MCNC: 103 MG/DL
POTASSIUM SERPL-SCNC: 4.1 MMOL/L
SODIUM SERPL-SCNC: 141 MMOL/L

## 2023-06-09 ENCOUNTER — NON-APPOINTMENT (OUTPATIENT)
Age: 23
End: 2023-06-09

## 2023-06-14 ENCOUNTER — APPOINTMENT (OUTPATIENT)
Dept: ORTHOPEDIC SURGERY | Facility: CLINIC | Age: 23
End: 2023-06-14
Payer: COMMERCIAL

## 2023-06-14 ENCOUNTER — NON-APPOINTMENT (OUTPATIENT)
Age: 23
End: 2023-06-14

## 2023-06-14 VITALS
DIASTOLIC BLOOD PRESSURE: 72 MMHG | HEIGHT: 63 IN | WEIGHT: 224 LBS | BODY MASS INDEX: 39.69 KG/M2 | HEART RATE: 54 BPM | SYSTOLIC BLOOD PRESSURE: 107 MMHG

## 2023-06-14 PROCEDURE — 99204 OFFICE O/P NEW MOD 45 MIN: CPT

## 2023-06-14 NOTE — PHYSICAL EXAM
[de-identified] : Left ankle Physical Examination:\par \par General: Alert and oriented x3.  In no acute distress.  Pleasant in nature with a normal affect.  No apparent respiratory distress. \par Erythema, Warmth, Rubor: Negative\par Swelling: Negative\par \par ROM:\par 1. Dorsiflexion: 10 degrees\par 2. Plantarflexion: 40 degrees\par 3. Inversion: 30 degrees\par 4. Eversion: 20 degrees\par \par Tenderness to Palpation: \par 1. Lateral Malleolus: Negative\par 2. Medial Malleolus: Negative\par 3. Proximal Fibular Pain: Negative\par 4. Heel Pain: Negative\par 5. Cuboid: Negative\par 6. Navicular: Negative\par 7. Tibiotalar Joint: Negative\par 8. Subtalar Joint: Negative\par 9. Posterior Recess: Negative\par 10. Fibula: Positive\par 11. Ligaments: Positive\par \par Tendon Pain:\par 1. Achilles: Negative\par 2. Peroneals: Negative\par 3. Posterior Tibialis: Negative\par 4. Tibialis Anterior: Negative\par \par Ligament Pain:\par 1. ATFL: Negative\par 2. CFL: Negative \par 3. PTFL: Negative\par 4. Deltoid Ligaments: Negative\par 5. Lis Franc Ligament: Negative\par \par Stability: \par 1. Anterior Drawer: Negative\par 2. Posterior Drawer: Negative\par \par Strength: 5/5 TA/GS/EHL\par \par Pulses: 2+ DP/PT Pulses\par \par Neuro: Intact motor and sensory\par \par Additional Test:\par 1. Calcaneal Squeeze Test: Negative\par 2. Syndesmosis Squeeze Test: Negative [de-identified] : \par Procedure was performed at the [facility]\par \par EXAM: ANKLE LT 3 VIEWS\par \par \par PROCEDURE DATE: 06/10/2023\par \par \par INTERPRETATION: INDICATION: Arthralgia of left ankle\par \par COMPARISON: None available\par \par FINDING: Three views of left ankle demonstrates no fracture. Ankle mortise is congruent. There is moderate lateral ankle soft tissue swelling.\par \par IMPRESSION:\par No fracture. Soft tissue swelling.\par \par --- End of Report ---\par \par \par \par \par \par \par EVELIA VELEZ MD; Attending Radiologist\par This document has been electronically signed. Robert 10 2023 7:07PM

## 2023-06-14 NOTE — ADDENDUM
[FreeTextEntry1] : I, DEVORAH SIMPSON, acted solely as a scribe for Dr. Papito Shahid on this date 06/14/2023  .\par  \par All medical record entries made by the Scribe were at my, Dr. Papito Shahid, direction and personally dictated by me on 06/14/2023 . I have reviewed the chart and agree that the record accurately reflects my personal performance of the history, physical exam, assessment and plan. I have also personally directed, reviewed, and agreed with the chart.

## 2023-06-14 NOTE — HISTORY OF PRESENT ILLNESS
[FreeTextEntry1] : The patient is a 22-year-old female who presents with left ankle pain. On 6/9/2023, The patient stats that she went to a music festival, and was drinking. She believes that she fell. She woke up the next morning with an increase in global ankle pain and swelling. Went to [facility] in [place] where she obtained XR imaging. At present her pain is between a 3-5/10. She presents today wearing sneakers and is ambulating with crutches. 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 \par \par Mx Intercranial Hypertension.

## 2023-06-14 NOTE — DISCUSSION/SUMMARY
[de-identified] : Today I had a lengthy discussion with the patient regarding their left ankle pain. I have addressed all the patient's concerns surrounding the pathology of their condition.  I have reviewed the patient's XR imaging with them in great detail. \par I recommended that the patient utilize a CAM boot. The patient was fitted for the CAM boot in the office today. The patient was educated about the boot wear pattern and utilization, as well as the timeframe to come out of the boot. WBAT. She was also given full instructions for using the boot. \par I recommend that the patient utilize ice, NSAIDS PRN, and heat. They can also elevate their LLE above the level of the heart. \par \par Out of work until next reassessment follow up visit in 2 weeks. \par \par The patient understood and verbally agreed to the treatment plan. All of their questions were answered and they were satisfied with the visit. The patient should call the office if they have any questions or experience worsening symptoms.

## 2023-06-28 ENCOUNTER — APPOINTMENT (OUTPATIENT)
Dept: ORTHOPEDIC SURGERY | Facility: CLINIC | Age: 23
End: 2023-06-28
Payer: COMMERCIAL

## 2023-06-28 DIAGNOSIS — M25.572 PAIN IN LEFT ANKLE AND JOINTS OF LEFT FOOT: ICD-10-CM

## 2023-06-28 DIAGNOSIS — S93.402A SPRAIN OF UNSPECIFIED LIGAMENT OF LEFT ANKLE, INITIAL ENCOUNTER: ICD-10-CM

## 2023-06-28 PROCEDURE — 99213 OFFICE O/P EST LOW 20 MIN: CPT

## 2023-06-28 NOTE — DISCUSSION/SUMMARY
[de-identified] : Today I had a lengthy discussion with the patient regarding their left ankle pain. I have addressed all the patient's concerns surrounding the pathology of their condition. \par \par I recommend that the patient utilize ice, NSAIDS PRN, and heat. They can also elevate their LLE above the level of the heart. \par \par Patient can return to work tomorrow, 06/29/2023. \par \par The patient understood and verbally agreed to the treatment plan. All of their questions were answered and they were satisfied with the visit. The patient should call the office if they have any questions or experience worsening symptoms.

## 2023-06-28 NOTE — PHYSICAL EXAM
[de-identified] : Left ankle Physical Examination:\par \par General: Alert and oriented x3.  In no acute distress.  Pleasant in nature with a normal affect.  No apparent respiratory distress. \par Erythema, Warmth, Rubor: Negative\par Swelling: Negative\par \par ROM:\par 1. Dorsiflexion: 10 degrees\par 2. Plantarflexion: 40 degrees\par 3. Inversion: 30 degrees\par 4. Eversion: 20 degrees\par \par Tenderness to Palpation: \par 1. Lateral Malleolus: Negative\par 2. Medial Malleolus: Negative\par 3. Proximal Fibular Pain: Negative\par 4. Heel Pain: Negative\par 5. Cuboid: Negative\par 6. Navicular: Negative\par 7. Tibiotalar Joint: Negative\par 8. Subtalar Joint: Negative\par 9. Posterior Recess: Negative\par 10. Fibula: Positive\par 11. Ligaments: Positive\par \par Tendon Pain:\par 1. Achilles: Negative\par 2. Peroneals: Negative\par 3. Posterior Tibialis: Negative\par 4. Tibialis Anterior: Negative\par \par Ligament Pain:\par 1. ATFL: Negative\par 2. CFL: Negative \par 3. PTFL: Negative\par 4. Deltoid Ligaments: Negative\par 5. Lis Franc Ligament: Negative\par \par Stability: \par 1. Anterior Drawer: Negative\par 2. Posterior Drawer: Negative\par \par Strength: 5/5 TA/GS/EHL\par \par Pulses: 2+ DP/PT Pulses\par \par Neuro: Intact motor and sensory\par \par Additional Test:\par 1. Calcaneal Squeeze Test: Negative\par 2. Syndesmosis Squeeze Test: Negative [de-identified] : No new imaging.

## 2023-06-28 NOTE — HISTORY OF PRESENT ILLNESS
[FreeTextEntry1] : 06/28/2023: The patient is a 22 year old female presenting to the office for a follow up evaluation of left ankle pain. She reports that she is doing well and her symptoms have improved. She still notes some intermittent pain, but otherwise, no other complaints. [facility]. \par \par 06/14/2023: The patient is a 22-year-old female who presents with left ankle pain. On 6/9/2023, The patient stats that she went to a music festival, and was drinking. She believes that she fell. She woke up the next morning with an increase in global ankle pain and swelling. Went to [facility] in [place] where she obtained XR imaging. At present her pain is between a 3-5/10. She presents today wearing sneakers and is ambulating with crutches. 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 \par \par Mx Intercranial Hypertension.

## 2023-06-28 NOTE — ADDENDUM
[FreeTextEntry1] : I, DEVORAH SIMPSON, acted solely as a scribe for Dr. Papito Shahid on this date 06/28/2023  .\par  \par All medical record entries made by the Scribe were at my, Dr. Papito Shahid, direction and personally dictated by me on 06/28/2023 . I have reviewed the chart and agree that the record accurately reflects my personal performance of the history, physical exam, assessment and plan. I have also personally directed, reviewed, and agreed with the chart.

## 2023-07-28 ENCOUNTER — APPOINTMENT (OUTPATIENT)
Dept: GASTROENTEROLOGY | Facility: CLINIC | Age: 23
End: 2023-07-28
Payer: COMMERCIAL

## 2023-07-28 VITALS
HEIGHT: 63 IN | RESPIRATION RATE: 16 BRPM | DIASTOLIC BLOOD PRESSURE: 75 MMHG | HEART RATE: 65 BPM | WEIGHT: 216 LBS | BODY MASS INDEX: 38.27 KG/M2 | OXYGEN SATURATION: 98 % | SYSTOLIC BLOOD PRESSURE: 120 MMHG

## 2023-07-28 DIAGNOSIS — R07.89 OTHER CHEST PAIN: ICD-10-CM

## 2023-07-28 DIAGNOSIS — R10.13 EPIGASTRIC PAIN: ICD-10-CM

## 2023-07-28 DIAGNOSIS — R07.9 CHEST PAIN, UNSPECIFIED: ICD-10-CM

## 2023-07-28 DIAGNOSIS — R63.0 ANOREXIA: ICD-10-CM

## 2023-07-28 DIAGNOSIS — R10.9 UNSPECIFIED ABDOMINAL PAIN: ICD-10-CM

## 2023-07-28 DIAGNOSIS — Z80.0 FAMILY HISTORY OF MALIGNANT NEOPLASM OF DIGESTIVE ORGANS: ICD-10-CM

## 2023-07-28 PROCEDURE — 99204 OFFICE O/P NEW MOD 45 MIN: CPT

## 2023-07-28 NOTE — PHYSICAL EXAM
[Alert] : alert [Normal Voice/Communication] : normal voice/communication [No Acute Distress] : no acute distress [Sclera] : the sclera and conjunctiva were normal [Hearing Threshold Finger Rub Not [place]] : hearing was normal [Normal Lips/Gums] : the lips and gums were normal [Normal Appearance] : the appearance of the neck was normal [No Neck Mass] : no neck mass was observed [No Respiratory Distress] : no respiratory distress [No Acc Muscle Use] : no accessory muscle use [Respiration, Rhythm And Depth] : normal respiratory rhythm and effort [Auscultation Breath Sounds / Voice Sounds] : lungs were clear to auscultation bilaterally [Heart Rate And Rhythm] : heart rate was normal and rhythm regular [None] : no edema [Bowel Sounds] : normal bowel sounds [Abdomen Tenderness] : non-tender [No Masses] : no abdominal mass palpated [Abdomen Soft] : soft [Cervical Lymph Nodes Enlarged Posterior Bilaterally] : no posterior cervical lymphadenopathy [Supraclavicular Lymph Nodes Enlarged Bilaterally] : no supraclavicular lymphadenopathy [Cervical Lymph Nodes Enlarged Anterior Bilaterally] : no anterior cervical lymphadenopathy [No CVA Tenderness] : no CVA  tenderness [No Spinal Tenderness] : no spinal tenderness [Abnormal Walk] : normal gait [Normal Color / Pigmentation] : normal skin color and pigmentation [No Focal Deficits] : no focal deficits [Oriented To Time, Place, And Person] : oriented to person, place, and time

## 2023-07-28 NOTE — ASSESSMENT
[FreeTextEntry1] : 22-year-old female with a history of pseudotumor cerebri who presents for evaluation of chest and epigastric pain\par \par \par Chest and epigastric pain\par This may be atypical acid reflux versus esophageal spasm\par Plan for barium esophagram to evaluate for esophageal strictures, dysmotility\par Plan for endoscopy with esophageal biopsies to evaluate for EOE\par Recommended pantoprazole 40 mg as a trial however she is reluctant to add any additional medications at this time therefore will reassess at the time of endoscopy

## 2023-07-28 NOTE — REVIEW OF SYSTEMS
[Fever] : no fever [Chills] : no chills [Feeling Tired] : not feeling tired [Recent Weight Loss (___ Lbs)] : recent [unfilled] ~Ulb weight loss [Abdominal Pain] : abdominal pain [Vomiting] : no vomiting [Constipation] : no constipation [Diarrhea] : no diarrhea [Heartburn] : no heartburn [Melena (black stool)] : no melena [Bleeding] : no bleeding [Bloating (gassiness)] : no bloating [Negative] : Heme/Lymph [FreeTextEntry7] : Nausea, dysphagia

## 2023-07-28 NOTE — HISTORY OF PRESENT ILLNESS
[FreeTextEntry1] : 22-year-old female with a history of pseudotumor cerebri who presents for evaluation of chest and epigastric pain.\par \par She reports symptoms began 1 month ago.  She reports mid chest pain radiating to the neck along with epigastric pain.  The pain is unrelated to meals.  She does have difficulty swallowing.  She reports that she has early satiety and has unintentionally lost 8 pounds since her symptoms began.  \par \par She uses marijuana and edibles daily.  She reports social alcohol use.  She denies tobacco use.  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

## 2023-08-09 ENCOUNTER — OUTPATIENT (OUTPATIENT)
Dept: OUTPATIENT SERVICES | Facility: HOSPITAL | Age: 23
LOS: 1 days | End: 2023-08-09
Payer: COMMERCIAL

## 2023-08-09 DIAGNOSIS — R11.0 NAUSEA: ICD-10-CM

## 2023-08-09 DIAGNOSIS — R07.89 OTHER CHEST PAIN: ICD-10-CM

## 2023-08-09 PROCEDURE — 74220 X-RAY XM ESOPHAGUS 1CNTRST: CPT

## 2023-08-09 PROCEDURE — 74220 X-RAY XM ESOPHAGUS 1CNTRST: CPT | Mod: 26

## 2023-08-27 ENCOUNTER — TRANSCRIPTION ENCOUNTER (OUTPATIENT)
Age: 23
End: 2023-08-27

## 2023-08-28 ENCOUNTER — RESULT REVIEW (OUTPATIENT)
Age: 23
End: 2023-08-28

## 2023-08-28 ENCOUNTER — APPOINTMENT (OUTPATIENT)
Dept: GASTROENTEROLOGY | Facility: HOSPITAL | Age: 23
End: 2023-08-28

## 2023-08-28 ENCOUNTER — OUTPATIENT (OUTPATIENT)
Dept: OUTPATIENT SERVICES | Facility: HOSPITAL | Age: 23
LOS: 1 days | End: 2023-08-28
Payer: COMMERCIAL

## 2023-08-28 DIAGNOSIS — R11.0 NAUSEA: ICD-10-CM

## 2023-08-28 DIAGNOSIS — R07.89 OTHER CHEST PAIN: ICD-10-CM

## 2023-08-28 PROCEDURE — 88342 IMHCHEM/IMCYTCHM 1ST ANTB: CPT | Mod: 26

## 2023-08-28 PROCEDURE — 43239 EGD BIOPSY SINGLE/MULTIPLE: CPT

## 2023-08-28 PROCEDURE — 88342 IMHCHEM/IMCYTCHM 1ST ANTB: CPT

## 2023-08-28 PROCEDURE — 88305 TISSUE EXAM BY PATHOLOGIST: CPT | Mod: 26

## 2023-08-28 PROCEDURE — 88305 TISSUE EXAM BY PATHOLOGIST: CPT

## 2023-08-28 NOTE — PHYSICAL EXAM
[Alert] : alert [Normal Voice/Communication] : normal voice/communication [No Acute Distress] : no acute distress [Sclera] : the sclera and conjunctiva were normal [Hearing Threshold Finger Rub Not Leslie] : hearing was normal [Normal Lips/Gums] : the lips and gums were normal [Normal Appearance] : the appearance of the neck was normal [No Neck Mass] : no neck mass was observed [No Respiratory Distress] : no respiratory distress [No Acc Muscle Use] : no accessory muscle use [Respiration, Rhythm And Depth] : normal respiratory rhythm and effort [Auscultation Breath Sounds / Voice Sounds] : lungs were clear to auscultation bilaterally [Heart Rate And Rhythm] : heart rate was normal and rhythm regular [None] : no edema [Bowel Sounds] : normal bowel sounds [Abdomen Tenderness] : non-tender [No Masses] : no abdominal mass palpated [Abdomen Soft] : soft [Cervical Lymph Nodes Enlarged Posterior Bilaterally] : no posterior cervical lymphadenopathy [Supraclavicular Lymph Nodes Enlarged Bilaterally] : no supraclavicular lymphadenopathy [Cervical Lymph Nodes Enlarged Anterior Bilaterally] : no anterior cervical lymphadenopathy [No CVA Tenderness] : no CVA  tenderness [No Spinal Tenderness] : no spinal tenderness [Abnormal Walk] : normal gait [Normal Color / Pigmentation] : normal skin color and pigmentation [No Focal Deficits] : no focal deficits [Oriented To Time, Place, And Person] : oriented to person, place, and time

## 2023-08-28 NOTE — REVIEW OF SYSTEMS
[Fever] : no fever [Chills] : no chills [Feeling Tired] : not feeling tired [Recent Weight Loss (___ Lbs)] : recent [unfilled] ~Ulb weight loss [Abdominal Pain] : abdominal pain [Vomiting] : no vomiting [Constipation] : no constipation [Diarrhea] : no diarrhea [Heartburn] : no heartburn [Melena (black stool)] : no melena [Swollen Glands] : no swollen glands [Negative] : Heme/Lymph [FreeTextEntry7] : Nausea, dysphagia

## 2023-08-28 NOTE — ASSESSMENT
[FreeTextEntry1] : Procedure risks and benefits reviewed with patient See anesthesia note for asa and mallampati

## 2023-09-01 LAB — SURGICAL PATHOLOGY STUDY: SIGNIFICANT CHANGE UP

## 2023-09-20 ENCOUNTER — OFFICE (OUTPATIENT)
Dept: URBAN - METROPOLITAN AREA CLINIC 115 | Facility: CLINIC | Age: 23
Setting detail: OPHTHALMOLOGY
End: 2023-09-20
Payer: COMMERCIAL

## 2023-09-20 DIAGNOSIS — H47.11: ICD-10-CM

## 2023-09-20 DIAGNOSIS — G93.2: ICD-10-CM

## 2023-09-20 DIAGNOSIS — H53.431: ICD-10-CM

## 2023-09-20 DIAGNOSIS — H52.13: ICD-10-CM

## 2023-09-20 PROCEDURE — 92015 DETERMINE REFRACTIVE STATE: CPT | Performed by: OPHTHALMOLOGY

## 2023-09-20 PROCEDURE — 92133 CPTRZD OPH DX IMG PST SGM ON: CPT | Performed by: OPHTHALMOLOGY

## 2023-09-20 PROCEDURE — 92012 INTRM OPH EXAM EST PATIENT: CPT | Performed by: OPHTHALMOLOGY

## 2023-09-20 PROCEDURE — 92083 EXTENDED VISUAL FIELD XM: CPT | Performed by: OPHTHALMOLOGY

## 2023-09-20 ASSESSMENT — CONFRONTATIONAL VISUAL FIELD TEST (CVF)
OD_FINDINGS: FULL
OS_FINDINGS: FULL

## 2023-09-20 ASSESSMENT — REFRACTION_CURRENTRX
OS_SPHERE: -0.50
OD_VPRISM_DIRECTION: SV
OS_OVR_VA: 20/
OD_SPHERE: -1.25
OS_VPRISM_DIRECTION: SV
OD_OVR_VA: 20/

## 2023-09-20 ASSESSMENT — REFRACTION_AUTOREFRACTION
OD_CYLINDER: -0.25
OD_SPHERE: -1.25
OS_SPHERE: -0.75
OD_AXIS: 010

## 2023-09-20 ASSESSMENT — VISUAL ACUITY
OS_BCVA: 20/20
OD_BCVA: 20/20

## 2023-09-20 ASSESSMENT — SPHEQUIV_DERIVED
OD_SPHEQUIV: -1.375
OD_SPHEQUIV: -1.375

## 2023-09-20 ASSESSMENT — REFRACTION_MANIFEST
OD_AXIS: 110
OD_VA1: 20/20
OD_CYLINDER: -0.25
OD_SPHERE: -1.25
OS_CYLINDER: SPH
OU_VA: 20/20
OS_SPHERE: -0.50
OS_VA1: 20/20

## 2023-11-01 ENCOUNTER — APPOINTMENT (OUTPATIENT)
Dept: FAMILY MEDICINE | Facility: CLINIC | Age: 23
End: 2023-11-01
Payer: COMMERCIAL

## 2023-11-01 VITALS
HEART RATE: 56 BPM | WEIGHT: 214 LBS | DIASTOLIC BLOOD PRESSURE: 70 MMHG | OXYGEN SATURATION: 99 % | SYSTOLIC BLOOD PRESSURE: 100 MMHG | RESPIRATION RATE: 14 BRPM | BODY MASS INDEX: 37.92 KG/M2 | TEMPERATURE: 98 F | HEIGHT: 63 IN

## 2023-11-01 DIAGNOSIS — E66.9 OBESITY, UNSPECIFIED: ICD-10-CM

## 2023-11-01 DIAGNOSIS — Z00.00 ENCOUNTER FOR GENERAL ADULT MEDICAL EXAMINATION W/OUT ABNORMAL FINDINGS: ICD-10-CM

## 2023-11-01 PROCEDURE — 99395 PREV VISIT EST AGE 18-39: CPT | Mod: 25

## 2023-11-01 PROCEDURE — G0447 BEHAVIOR COUNSEL OBESITY 15M: CPT

## 2023-11-01 PROCEDURE — 36415 COLL VENOUS BLD VENIPUNCTURE: CPT

## 2023-11-02 LAB
ALBUMIN SERPL ELPH-MCNC: 4.5 G/DL
ALP BLD-CCNC: 60 U/L
ALT SERPL-CCNC: 13 U/L
ANION GAP SERPL CALC-SCNC: 12 MMOL/L
AST SERPL-CCNC: 11 U/L
BASOPHILS # BLD AUTO: 0.06 K/UL
BASOPHILS NFR BLD AUTO: 0.8 %
BILIRUB SERPL-MCNC: 0.3 MG/DL
BUN SERPL-MCNC: 19 MG/DL
CALCIUM SERPL-MCNC: 9.1 MG/DL
CHLORIDE SERPL-SCNC: 109 MMOL/L
CHOLEST SERPL-MCNC: 189 MG/DL
CO2 SERPL-SCNC: 21 MMOL/L
CREAT SERPL-MCNC: 0.69 MG/DL
EGFR: 124 ML/MIN/1.73M2
EOSINOPHIL # BLD AUTO: 0.29 K/UL
EOSINOPHIL NFR BLD AUTO: 3.8 %
GLUCOSE SERPL-MCNC: 108 MG/DL
HCT VFR BLD CALC: 44.8 %
HDLC SERPL-MCNC: 64 MG/DL
HGB BLD-MCNC: 14.2 G/DL
IMM GRANULOCYTES NFR BLD AUTO: 0.1 %
LDLC SERPL CALC-MCNC: 102 MG/DL
LYMPHOCYTES # BLD AUTO: 2.1 K/UL
LYMPHOCYTES NFR BLD AUTO: 27.4 %
MAN DIFF?: NORMAL
MCHC RBC-ENTMCNC: 28.4 PG
MCHC RBC-ENTMCNC: 31.7 GM/DL
MCV RBC AUTO: 89.6 FL
MONOCYTES # BLD AUTO: 0.47 K/UL
MONOCYTES NFR BLD AUTO: 6.1 %
NEUTROPHILS # BLD AUTO: 4.73 K/UL
NEUTROPHILS NFR BLD AUTO: 61.8 %
NONHDLC SERPL-MCNC: 125 MG/DL
PLATELET # BLD AUTO: 349 K/UL
POTASSIUM SERPL-SCNC: 4 MMOL/L
PROT SERPL-MCNC: 6.9 G/DL
RBC # BLD: 5 M/UL
RBC # FLD: 12.6 %
SODIUM SERPL-SCNC: 141 MMOL/L
TRIGL SERPL-MCNC: 129 MG/DL
TSH SERPL-ACNC: 2.95 UIU/ML
WBC # FLD AUTO: 7.66 K/UL

## 2023-12-04 ENCOUNTER — RX RENEWAL (OUTPATIENT)
Age: 23
End: 2023-12-04

## 2023-12-04 RX ORDER — SERTRALINE 25 MG/1
25 TABLET, FILM COATED ORAL
Qty: 90 | Refills: 3 | Status: ACTIVE | COMMUNITY
Start: 2022-10-31 | End: 1900-01-01

## 2023-12-09 ENCOUNTER — NON-APPOINTMENT (OUTPATIENT)
Age: 23
End: 2023-12-09

## 2023-12-17 NOTE — ED STATDOCS - PRO INTERPRETER NEED 2
Pt through triage with wife 
English

## 2024-01-02 ENCOUNTER — APPOINTMENT (OUTPATIENT)
Dept: OBGYN | Facility: CLINIC | Age: 24
End: 2024-01-02
Payer: COMMERCIAL

## 2024-01-02 VITALS
WEIGHT: 214 LBS | SYSTOLIC BLOOD PRESSURE: 117 MMHG | BODY MASS INDEX: 39.38 KG/M2 | HEIGHT: 62 IN | DIASTOLIC BLOOD PRESSURE: 73 MMHG

## 2024-01-02 DIAGNOSIS — Z01.419 ENCOUNTER FOR GYNECOLOGICAL EXAMINATION (GENERAL) (ROUTINE) W/OUT ABNORMAL FINDINGS: ICD-10-CM

## 2024-01-02 DIAGNOSIS — R10.2 PELVIC AND PERINEAL PAIN: ICD-10-CM

## 2024-01-02 PROCEDURE — 99213 OFFICE O/P EST LOW 20 MIN: CPT | Mod: 25

## 2024-01-02 PROCEDURE — 99395 PREV VISIT EST AGE 18-39: CPT

## 2024-01-02 NOTE — PLAN
[FreeTextEntry1] : 23 year old female wwe  1. Pap done with GC cultures 2. SBE teaching done 3.  Patient with new onset of breakthrough bleeding, heavy menses and pelvic pain.  The patient states that she has never had this on her OCP in the past.  She was thinking about stopping her OCP however would like to have a workup done first.  Rx was provided for CBC and TSH.  She will return to the office for transvaginal sonogram to rule out GYN pathology.  We discussed potential treatment options including observation, stopping her OCP, changing her OCP or treating any GYN pathology if found on the sonogram.  The patient was given the opportunity to ask questions and all were answered to her satisfaction. 4. Rx tri-lo-raysa x 1 year (+/- d/w patient) 5. RTO in 1 year for wwe

## 2024-01-02 NOTE — HISTORY OF PRESENT ILLNESS
[FreeTextEntry1] : 23 year old female presents for wwe.  She has no complaints today.  She has a history of hypermenorrhea and is taking tri-lo-raysa with good results.  Menarche was at the age of 10.  Prior to OCP's menses were q 14-21 days, lasting 7-14 days.  Since being on the pill menses are q 28 days, lasting 5 days.  She denies menorrhagia and dysmenorrhea.  The patient states over the past few months she has had breakthrough bleeding.  She has not missed any pills or taking them late.  She has also noticed over the last few months her menses are now lasting 7 days and are heavier than usual.  She is not soaking through more than 1 pad per hour.  She has also noticed some occasional pelvic pain.  The patient is interested in having a workup.  She is also complaining of leaking of urine with coughing, sneezing, or laughing.  The patient has been trying some Kegel exercises at home but is not doing them routinely.  She has no history of ovarian cysts, fibroids, endometriosis, STD's or abnormal pap's.  She is sexually active.    PMH is significant for pseudotumor cerebri (follows with neuro q 6 months).  She also has a history of depression and anxiety.  She has never had surgery.  She has a family history of breast cancer on her maternal side.  Her maternal grandmother had breast cancer at 76 and in BRCA negative.  She also has maternal great aunts that had breast cancer.  She denies tobacco and illicit drugs.  She does socially drink alcohol.  Gyn history as above.  Nulligravid.  She currently takes zoloft, mvi, vitamin C, diamox and OCP's.

## 2024-01-03 ENCOUNTER — OFFICE (OUTPATIENT)
Dept: URBAN - METROPOLITAN AREA CLINIC 115 | Facility: CLINIC | Age: 24
Setting detail: OPHTHALMOLOGY
End: 2024-01-03
Payer: COMMERCIAL

## 2024-01-03 DIAGNOSIS — G93.2: ICD-10-CM

## 2024-01-03 DIAGNOSIS — H47.11: ICD-10-CM

## 2024-01-03 DIAGNOSIS — G43.009: ICD-10-CM

## 2024-01-03 DIAGNOSIS — H53.433: ICD-10-CM

## 2024-01-03 PROCEDURE — 92250 FUNDUS PHOTOGRAPHY W/I&R: CPT | Performed by: OPHTHALMOLOGY

## 2024-01-03 PROCEDURE — 92014 COMPRE OPH EXAM EST PT 1/>: CPT | Performed by: OPHTHALMOLOGY

## 2024-01-03 PROCEDURE — 92083 EXTENDED VISUAL FIELD XM: CPT | Performed by: OPHTHALMOLOGY

## 2024-01-03 ASSESSMENT — REFRACTION_CURRENTRX
OD_SPHERE: -1.25
OS_SPHERE: -0.50
OD_OVR_VA: 20/
OD_VPRISM_DIRECTION: SV
OS_OVR_VA: 20/
OS_VPRISM_DIRECTION: SV

## 2024-01-03 ASSESSMENT — REFRACTION_MANIFEST
OD_VA1: 20/20
OD_AXIS: 110
OD_CYLINDER: -0.25
OU_VA: 20/20
OS_CYLINDER: SPH
OD_SPHERE: -1.25
OS_SPHERE: -0.50
OS_VA1: 20/20

## 2024-01-03 ASSESSMENT — CONFRONTATIONAL VISUAL FIELD TEST (CVF)
OS_FINDINGS: FULL
OD_FINDINGS: FULL

## 2024-01-03 ASSESSMENT — SPHEQUIV_DERIVED: OD_SPHEQUIV: -1.375

## 2024-01-03 ASSESSMENT — REFRACTION_AUTOREFRACTION
OS_SPHERE: -0.50
OD_SPHERE: -1.00

## 2024-01-05 LAB — CYTOLOGY CVX/VAG DOC THIN PREP: NORMAL

## 2024-01-23 ENCOUNTER — APPOINTMENT (OUTPATIENT)
Dept: NEUROLOGY | Facility: CLINIC | Age: 24
End: 2024-01-23
Payer: COMMERCIAL

## 2024-01-23 VITALS
DIASTOLIC BLOOD PRESSURE: 76 MMHG | BODY MASS INDEX: 38.64 KG/M2 | WEIGHT: 210 LBS | SYSTOLIC BLOOD PRESSURE: 114 MMHG | HEIGHT: 62 IN

## 2024-01-23 DIAGNOSIS — G93.2 BENIGN INTRACRANIAL HYPERTENSION: ICD-10-CM

## 2024-01-23 PROCEDURE — 99213 OFFICE O/P EST LOW 20 MIN: CPT

## 2024-01-23 PROCEDURE — G2211 COMPLEX E/M VISIT ADD ON: CPT

## 2024-01-23 NOTE — CONSULT LETTER
[Dear  ___] : Dear  [unfilled], [Courtesy Letter:] : I had the pleasure of seeing your patient, [unfilled], in my office today. [Please see my note below.] : Please see my note below. [Consult Closing:] : Thank you very much for allowing me to participate in the care of this patient.  If you have any questions, please do not hesitate to contact me. [Sincerely,] : Sincerely, [FreeTextEntry3] : Suleman Mercado M.D., Ph.D. DPN-[facility] [place] Director, [facility], [place]

## 2024-01-23 NOTE — PHYSICAL EXAM

## 2024-01-23 NOTE — ASSESSMENT
[FreeTextEntry1] : This is a 23-year-old woman with pseudotumor cerebri.  She is overall stable and asymptomatic.  She will continue taking Diamox 500 mg twice a day.  She will continue to follow with Dr. Bridges.  I will see her back for ongoing care of her pseudotumor cerebri in 6 months, sooner should the need arise.

## 2024-01-23 NOTE — HISTORY OF PRESENT ILLNESS
[FreeTextEntry1] : Initial office visit January 25, 2019: This is a 18-year-old woman who presents today for neurologic lesion. She's been getting episodes of dizziness a spinning as well as lightheadedness. It's worse with standing up. Occasionally she'll have the sensation that her vision is blacking out for a few seconds which resolves as she sits down. If she is working out when laying down, such as doing abdominal exercises, when she gets back up after finishing the exercise she may get a pain in the temple on the left which lasts for one to 2 minutes. It is not persists. It is not present during exercise. She saw, just yesterday who questioned if she had idiopathic intracranial hypertension. She does have a known diagnosis of orthostatic hypotension. She is here today for neurologic evaluation.  Followup April 5, 2019: This is a 18-year-old woman returns today for followup of pseudotumor cerebri. Since her last visit she has had a spinal tap which showed elevated pressure. 2 post spinal headache and had an epidural blood patch which helped. She has seen Dr. Jordan again had residual papilledema and is now on Diamox 1000 mg in the morning and 500 mg in the afternoon. She has paresthesia in fatigue from this but is otherwise better with her headaches. She is here today for neurologic followup.  Followup July 8, 2019: This is a 18-year-old woman who presents today for followup of pseudotumor cerebri. She's doing well in Diamox 1500 mg total daily. Her eye exam is within stable per her report she saw Dr. Bridges 2 weeks ago. She reports a papilledema and is improved. She tolerated the Diamox well and no longer having paresthesia. She no longer has post LP headaches. His CSF showed a contaminant, she has no active meningitis. She is here today for a neurologic followup.  Followup January 10, 2020: This is a 19-year-old woman who presents today for followup of pseudotumor cerebri. She is doing well on Diamox 1500 mg total daily. She recently saw Dr. Jordan reports a stable I exam. She does not report headaches or vision loss. She is here today for a neurologic followup.  Followup June 22, 2020: This is a 19-year-old woman who presents today for followup of pseudotumor cerebri. She is seen by video visit during the corona virus outbreak. Verbal consent was obtained at the time of the visit. She understood I would not be able to visualize her fundi during this visit. She stated that she recently saw Dr. Bridges from neuro ophthalmology. who said her discs looked as best as I have in a long time. He is not experiencing severe headaches, when she does she takes Tylenol and it's resolved. She is not having any visual loss. She presents today for video visit for routine followup.  Followup December 21, 2020: This is a 20-year-old woman who presents today for followup of pseudotumor cerebri. She recently saw Dr. Bridges notes that her papilledema had worsened a bit. This was in the setting of her decreasing her Diamox one 1500 mg daily to 1000 mg daily. She has had to increase her Diamox to 1500 mg daily again. She's also gained some weight. She is otherwise doing well. She is here today for neurologic followup.  Follow-up March 4, 2022: This is a 21-year-old woman who presents today for follow-up of pseudotumor cerebri.  She states that she saw Dr. Bridges about 2 weeks ago and that her papilledema is stable if not resolved.  She is currently taking Diamox 500 mg twice a day.  She tolerates this dose well.  She is not experiencing headache or vision loss.  She is here today for neurologic follow-up.  Follow-up September 14, 2022: This is a 22-year-old woman who presents today for follow-up of pseudotumor cerebri.  She saw neuro-ophthalmology over the summer.  She states everything was okay.  She is not have any visual symptoms or headaches.  She continues on Diamox 500 mg twice a day.  She is tolerating this well other than some mild tingling in her feet.  She is here today for neurologic follow-up.  Follow-up March 23, 2023: This is a 22-year-old woman who presents today for  pseudotumor cerebri.  She is doing fairly stable on Diamox 500 mg twice a day.  She gets occasional images of stars but no vision loss or significant headache.  This of vision appearance of stars lasts briefly.  She has not seen ophthalmology since November.  She is here today for follow-up.  Follow-up January 23, 2024: This is a 23-year-old woman who presents today with pseudotumor cerebri.  She continues on Diamox 500 mg twice a day.  She follows with Dr. Bridges from neuro-ophthalmology.  She states that she did have some slight papilledema at last visit but because she was asymptomatic Dr. Plasencia is continuing the Diamox at 500 mg twice a day and will see her in 3 months instead of her routine 6-month visit.  She has remained asymptomatic without any significant headache or vision change.  She is here today for follow-up.

## 2024-02-28 ENCOUNTER — APPOINTMENT (OUTPATIENT)
Dept: ANTEPARTUM | Facility: CLINIC | Age: 24
End: 2024-02-28
Payer: COMMERCIAL

## 2024-02-28 ENCOUNTER — APPOINTMENT (OUTPATIENT)
Dept: OBGYN | Facility: CLINIC | Age: 24
End: 2024-02-28
Payer: COMMERCIAL

## 2024-02-28 ENCOUNTER — ASOB RESULT (OUTPATIENT)
Age: 24
End: 2024-02-28

## 2024-02-28 VITALS
DIASTOLIC BLOOD PRESSURE: 69 MMHG | HEIGHT: 62 IN | BODY MASS INDEX: 38.64 KG/M2 | SYSTOLIC BLOOD PRESSURE: 100 MMHG | WEIGHT: 210 LBS

## 2024-02-28 DIAGNOSIS — N92.1 EXCESSIVE AND FREQUENT MENSTRUATION WITH IRREGULAR CYCLE: ICD-10-CM

## 2024-02-28 DIAGNOSIS — N92.0 EXCESSIVE AND FREQUENT MENSTRUATION WITH REGULAR CYCLE: ICD-10-CM

## 2024-02-28 DIAGNOSIS — Z30.9 ENCOUNTER FOR CONTRACEPTIVE MANAGEMENT, UNSPECIFIED: ICD-10-CM

## 2024-02-28 PROCEDURE — 99213 OFFICE O/P EST LOW 20 MIN: CPT | Mod: 25

## 2024-02-28 PROCEDURE — 76830 TRANSVAGINAL US NON-OB: CPT

## 2024-02-28 PROCEDURE — 76856 US EXAM PELVIC COMPLETE: CPT | Mod: 59

## 2024-02-28 RX ORDER — NORGESTIMATE AND ETHINYL ESTRADIOL 7DAYSX3 LO
0.18/0.215/0.25 KIT ORAL
Qty: 3 | Refills: 3 | Status: COMPLETED | COMMUNITY
Start: 2020-11-20 | End: 2024-02-28

## 2024-02-28 NOTE — COUNSELING
[Nutrition/ Exercise/ Weight Management] : nutrition, exercise, weight management [Breast Self Exam] : breast self exam [Vitamins/Supplements] : vitamins/supplements [Contraception/ Emergency Contraception/ Safe Sexual Practices] : contraception, emergency contraception, safe sexual practices

## 2024-02-28 NOTE — HISTORY OF PRESENT ILLNESS
[FreeTextEntry1] : 23 year old female presents for blood work and sono results.  She has a history of hypermenorrhea and is taking tri-lo-raysa.  Menarche was at the age of 10.  Prior to OCP's menses were q 14-21 days, lasting 7-14 days.  Since being on the pill menses are q 28 days, lasting 5 days.  She denies menorrhagia and dysmenorrhea.  The patient states over the past few months she has had breakthrough bleeding.  She has not missed any pills or taking them late.  She has also noticed over the last few months her menses are now lasting 7 days and are heavier than usual.  She is not soaking through more than 1 pad per hour.  She has also noticed some occasional pelvic pain.  She has no history of ovarian cysts, fibroids, endometriosis, STD's or abnormal pap's.  She is sexually active.    At her last visit, she had discussed some stress incontinence that she has been having.  She also disclosed today that approximately 1 time per month she does feel a spasm in her vaginal muscles.  She states that currently this is not affecting the quality of her life.  PMH is significant for pseudotumor cerebri (follows with neuro q 6 months).  She also has a history of depression and anxiety.  She has never had surgery.  She has a family history of breast cancer on her maternal side.  Her maternal grandmother had breast cancer at 76 and in BRCA negative.  She also has maternal great aunts that had breast cancer.  She denies tobacco and illicit drugs.  She does socially drink alcohol.  Gyn history as above.  Nulligravid.  She currently takes zoloft, mvi, vitamin C, diamox and OCP's.

## 2024-02-28 NOTE — PLAN
[FreeTextEntry1] : 23-year-old female with history of breakthrough bleeding and new onset heavy menses on Tri-Lo-Doreen.  Blood work and sonogram were reviewed with the patient.  CBC and TSH were within normal limits.  Sonogram was reviewed with the patient.  She has a 0.9 cm cervical fibroid.  The patient was counseled on cervical fibroids.  She denies pelvic pain or pressure.  The remainder of her sonogram was within normal limits.  No other GYN pathology was noted.  We discussed that the cervical fibroid should not cause breakthrough bleeding or new onset heavy menses.  Treatment options for breakthrough bleeding and heavy menses were reviewed with the patient.  She would like to change her OCP.  She is currently taking Tri-Lo-Doreen.  Will increase the estrogen in her OCP to Tri-Sprintec.  All risk, benefits and potential complications of combined OCPs were reviewed with the patient.  Instructions were reviewed.  Counseling was given.  She will return to the office in 6 months for a blood pressure and OCP check.  Patient also with stress incontinence and pelvic floor spasms.  Patient states that currently the symptoms are not bothersome.  Advised the patient that we can give her referral to pelvic floor PT which she is not interested at this time.  Advised the patient that she can call the office if she changes her mind.  The patient was given the opportunity to ask questions and all were answered to her satisfaction.

## 2024-03-01 LAB
HCT VFR BLD CALC: 45.1 %
HGB BLD-MCNC: 13.5 G/DL
MCHC RBC-ENTMCNC: 27.4 PG
MCHC RBC-ENTMCNC: 29.9 GM/DL
MCV RBC AUTO: 91.5 FL
PLATELET # BLD AUTO: 393 K/UL
RBC # BLD: 4.93 M/UL
RBC # FLD: 12.9 %
TSH SERPL-ACNC: 2.37 UIU/ML
WBC # FLD AUTO: 9.5 K/UL

## 2024-03-12 RX ORDER — NORGESTIMATE AND ETHINYL ESTRADIOL 7DAYSX3 28
0.18/0.215/0.25 KIT ORAL
Qty: 84 | Refills: 3 | Status: ACTIVE | COMMUNITY
Start: 2024-02-28 | End: 1900-01-01

## 2024-03-19 RX ORDER — ACETAZOLAMIDE 500 MG/1
500 CAPSULE ORAL
Qty: 180 | Refills: 3 | Status: ACTIVE | COMMUNITY
Start: 2020-11-13 | End: 1900-01-01

## 2024-03-22 NOTE — ED ADULT NURSE NOTE - CINV DISCH MEDS REVIEWED YN
03/22/24 0900   Daily Treatment   Symptoms Review Activity Group 0900 - 1000   Affect Anxious;Appropriate   Mood Anxious   Thought Process Narrow   Psychosis None   Symptom Notation Pt reported that she is feeling better now that she is here, reporting extra nervousness with driving with the roads and the weather. Pt reported that she is grateful to make it to her last day. Pt reflected on her night and reported it's events.   Psychosocial Stressors Interpersonal conflict   Sleep Report Good  (Only up once for daughter!)   Hours Slept 7 hours   Meals Dinner;Breakfast   Goal Complete / Activity Pt reported she completed her goals.   Treatment Plan Changes/Additions to Treatment plan (see ITP)   Patient Response Good eye contact;Attentive   Comment Pt completed sx rating sheet.   RN Monitoring of Pain, Safety, and Relapse   Safety Concerns None   Physical Concerns 0/10   Pain Concerns 0/10   Use of Street Drugs or Alcohol No   Taking Medications as Prescribed Yes     Group Total: 6  Yasmine Cheng LPC-IT   Yes

## 2024-05-17 ENCOUNTER — OFFICE (OUTPATIENT)
Dept: URBAN - METROPOLITAN AREA CLINIC 115 | Facility: CLINIC | Age: 24
Setting detail: OPHTHALMOLOGY
End: 2024-05-17
Payer: COMMERCIAL

## 2024-05-17 DIAGNOSIS — H47.11: ICD-10-CM

## 2024-05-17 DIAGNOSIS — G43.009: ICD-10-CM

## 2024-05-17 DIAGNOSIS — G93.2: ICD-10-CM

## 2024-05-17 DIAGNOSIS — H53.433: ICD-10-CM

## 2024-05-17 PROCEDURE — 92083 EXTENDED VISUAL FIELD XM: CPT | Performed by: OPHTHALMOLOGY

## 2024-05-17 PROCEDURE — 92133 CPTRZD OPH DX IMG PST SGM ON: CPT | Performed by: OPHTHALMOLOGY

## 2024-05-17 PROCEDURE — 92012 INTRM OPH EXAM EST PATIENT: CPT | Performed by: OPHTHALMOLOGY

## 2024-05-17 ASSESSMENT — CONFRONTATIONAL VISUAL FIELD TEST (CVF)
OD_FINDINGS: FULL
OS_FINDINGS: FULL

## 2024-07-09 ENCOUNTER — APPOINTMENT (OUTPATIENT)
Dept: NEUROLOGY | Facility: CLINIC | Age: 24
End: 2024-07-09
Payer: COMMERCIAL

## 2024-07-09 VITALS
BODY MASS INDEX: 38.64 KG/M2 | WEIGHT: 210 LBS | SYSTOLIC BLOOD PRESSURE: 110 MMHG | DIASTOLIC BLOOD PRESSURE: 70 MMHG | HEIGHT: 62 IN

## 2024-07-09 DIAGNOSIS — G93.2 BENIGN INTRACRANIAL HYPERTENSION: ICD-10-CM

## 2024-07-09 PROCEDURE — G2211 COMPLEX E/M VISIT ADD ON: CPT

## 2024-07-09 PROCEDURE — 99213 OFFICE O/P EST LOW 20 MIN: CPT

## 2024-07-31 NOTE — H&P PST ADULT - NSANTHTIREDRD_ENT_A_CORE
7/31/2024       RE: Fernando Del Castillo  [address] [place] [address]     Dear Colleague,    Thank you for referring your patient, Fernando Del Castillo, to the [facility] [place] at [facility]. Please see a copy of my visit note below.    Facial Plastic and Reconstructive Surgery Consultation    Referring Provider:   Tamela Boswell MD  [address] [facility] [address]  [place] [address]    HPI:   I had the pleasure of seeing Fernando Del Castillo today in clinic for consultation for nasal obstruction and deviated nasal septum.    Fernando Del Castillo is a 29 year old with no significant past medical history who presented for nasal obstruction and congestion. He was also told by his PCP 3 years ago that he had a deviated septum. The patient reports difficulty with breathing through his nose and constant congestion, which has resulted in some anxiety about this. The patient snores at night and mouth breathes. He is unsure of which side is better, but reports occasionally maxillary sinus pain. The patient denies rhinorrhea. The patient has no known history of nasal trauma and denies cocaine use. He has never been evaluated by an allergist, but has tried allergy medications to try to alleviate his symptoms, but had no relief. The patient has also tried nasal steroid sprays for 3 months but had no benefit. His main goal is to improve his breathing, but otherwise likes the appearance of his nose.     The patient has a history of smoking cigarettes from [id number] and would smoke a pack every two weeks. He also had a vaping history, but quit in 2022.         Review Of Systems  ROS: 10 point ROS neg other than the symptoms noted above in the HPI.    There is no problem list on file for this patient.    No past surgical history on file.  Current Outpatient Medications   Medication Sig Dispense Refill     finasteride (PROPECIA) 1 MG tablet Take 1 tablet (1  mg) by mouth daily 90 tablet 3     traZODone (DESYREL) 50 MG tablet Take 1 tablet (50 mg) by mouth at bedtime 90 tablet 0     amphetamine-dextroamphetamine (ADDERALL XR) 10 MG 24 hr capsule Take 10 mg by mouth (Patient not taking: Reported on 5/29/2024)       buPROPion (WELLBUTRIN XL) 150 MG 24 hr tablet Take 150 mg by mouth       triamcinolone (KENALOG) 0.1 % external ointment Apply to rash twice daily for 2-4 weeks, then stop. Restart if rash flares in the future. Not for groin, underarms, or face. 30 g 4     Patient has no known allergies.  Social History     Socioeconomic History     Marital status: Single     Spouse name: Not on file     Number of children: Not on file     Years of education: Not on file     Highest education level: Not on file   Occupational History     Not on file   Tobacco Use     Smoking status: Former     Current packs/day: 0.00     Average packs/day: 0.5 packs/day for 7.0 years (3.5 ttl pk-yrs)     Types: Cigarettes     Start date: 9/1/2013     Quit date: 9/1/2020     Years since quitting: 3.9     Smokeless tobacco: Former     Quit date: 12/29/2022   Vaping Use     Vaping status: Never Used   Substance and Sexual Activity     Alcohol use: Yes     Comment: OCASSIONAL     Drug use: Never     Sexual activity: Yes     Partners: Male     Birth control/protection: None   Other Topics Concern     Parent/sibling w/ CABG, MI or angioplasty before 65F 55M? No   Social History Narrative     Not on file     Social Determinants of Health     Financial Resource Strain: Low Risk  (4/4/2024)    Financial Resource Strain      Within the past 12 months, have you or your family members you live with been unable to get utilities (heat, electricity) when it was really needed?: No   Food Insecurity: Unknown (4/4/2024)    Food Insecurity      Within the past 12 months, did you worry that your food would run out before you got money to buy more?: Patient declined      Within the past 12 months, did the food you  bought just not last and you didn t have money to get more?: Patient declined   Transportation Needs: Low Risk  (4/4/2024)    Transportation Needs      Within the past 12 months, has lack of transportation kept you from medical appointments, getting your medicines, non-medical meetings or appointments, work, or from getting things that you need?: No   Physical Activity: Inactive (3/8/2023)    [facility]    Exercise Vital Sign      Days of Exercise per Week: 0 days      Minutes of Exercise per Session: 0 min   Stress: Stress Concern Present (3/8/2023)    [facility]    [employer] - Occupational Stress Questionnaire      Feeling of Stress : To some extent   Social Connections: Moderately Isolated (3/8/2023)    [facility] from [facility]    Social Connection and Isolation Panel [NHANES]      Frequency of Communication with Friends and Family: Twice a week      Frequency of Social Gatherings with Friends and Family: Once a week      Attends [employer] Services: Never      Active Member of Clubs or Organizations: No      Attends Club or Organization Meetings: Never      Marital Status: Living with partner   Interpersonal Safety: Low Risk  (4/5/2024)    Interpersonal Safety      Do you feel physically and emotionally safe where you currently live?: Yes      Within the past 12 months, have you been hit, slapped, kicked or otherwise physically hurt by someone?: No      Within the past 12 months, have you been humiliated or emotionally abused in other ways by your partner or ex-partner?: No   Housing Stability: Low Risk  (4/4/2024)    Housing Stability      Do you have housing? : Yes      Are you worried about losing your housing?: No     Family History   Problem Relation Age of Onset     Depression Mother       Anxiety Disorder Mother      Mental Illness Mother      Hyperlipidemia Father      Lupus Sister      Other Cancer Brother      Diabetes Maternal Grandmother      Obesity Maternal Grandmother        PE:  Alert and Oriented, Answering Questions Appropriately  Atraumatic, Normocephalic, Face Symmetric  Skin: Leonard 1  Facial Nerve Intact and facial movement symmetric  EOM's, PEERL  Nasal Exam: Nasal dorsum deviation to the right. No mucopurulence or polyps, no masses  Chin: Normal   Lips/Teeth/Toungue/Gums: Lips intact, Normal Dentition, Occlusion intact, Oral mucosa intact, no lesions/ulcerations/masses, Tongue mobile  OP: Palate elevates with speech, Mallampati I, Uvula midline  Neck: trachea midline  Card: not diaphoretic  Neuro/Psych: CN's 2-12 intact, Moves all extremities, positive affect, no notable muscle weakness          PROCEDURE:Diagnostic Nasal Endoscopy   Indication: Nasal obstruction  Performed by: Nadja Parrish     Consent was obtained. 0 degree sinus endoscope was used. Nasal Endoscopy is performed to provide a more thorough evaluation of the nasal airway than seen on standard nasal speculum exam.  Findings are as follows:     Caudal septum deviated to the right with compensatory inferior turbinate hypertrophy. Body of septum deviated to the left with 60% obstruction.             Imaging: None      IMPRESSION/PLAN    Diagnoses of Nasal septal deviation, Snoring, and Mouth breathing were pertinent to this visit.  Fernando Del Castillo is a 29 year old male here for nasal obstruction and nasal septal deviation. Physical exam notable for caudal septal deviation to the right with compensatory inferior turbinate hypertrophy and body of septum deviated to the left. The patient has used nasal steroid sprays for at least 3 months and tried allergy medication and had no benefit.     Patient is an appropriate candidate for a septorhinoplasty to correct and alleviate nasal obstruction. Discussed the  procedure, risks, benefits, and postoperative peroid with the patient. Discussed that this is not meant to address the congestion and possible allergic symptoms. Patient understood and would like to proceed.       Photodocumentation was obtained.     I spent a total of 30 minutes in the care of patient Fernando Del Castillo during today's office visit. This time includes reviewing the patient's chart and prior history, obtaining a history, performing an examination and evaluation and counseling the patient. This time also includes ordering mediations or tests necessary in addition to communication to other member's of the patient's health care team. Time spent in documentation and care coordination is included.     Corazon Saleem, MS4     I, Nadja Parrish MD, was present with the medical student who participated in the service and in the documentation of the note.  I have verified the history and personally performed the physical exam and medical decision making.  I agree with the assessment and plan of care as documented in the note.                 Again, thank you for allowing me to participate in the care of your patient.      Sincerely,    Nadja Parrish MD     No

## 2024-08-07 ENCOUNTER — OFFICE (OUTPATIENT)
Dept: URBAN - METROPOLITAN AREA CLINIC 115 | Facility: CLINIC | Age: 24
Setting detail: OPHTHALMOLOGY
End: 2024-08-07
Payer: COMMERCIAL

## 2024-08-07 DIAGNOSIS — G93.2: ICD-10-CM

## 2024-08-07 DIAGNOSIS — H47.11: ICD-10-CM

## 2024-08-07 DIAGNOSIS — H53.433: ICD-10-CM

## 2024-08-07 DIAGNOSIS — G43.009: ICD-10-CM

## 2024-08-07 PROCEDURE — 92014 COMPRE OPH EXAM EST PT 1/>: CPT | Performed by: OPHTHALMOLOGY

## 2024-08-07 PROCEDURE — 92133 CPTRZD OPH DX IMG PST SGM ON: CPT | Performed by: OPHTHALMOLOGY

## 2024-08-07 PROCEDURE — 92083 EXTENDED VISUAL FIELD XM: CPT | Performed by: OPHTHALMOLOGY

## 2024-08-07 ASSESSMENT — CONFRONTATIONAL VISUAL FIELD TEST (CVF)
OD_FINDINGS: FULL
OS_FINDINGS: FULL

## 2024-09-12 ENCOUNTER — APPOINTMENT (OUTPATIENT)
Dept: PULMONOLOGY | Facility: CLINIC | Age: 24
End: 2024-09-12
Payer: COMMERCIAL

## 2024-09-12 VITALS
RESPIRATION RATE: 16 BRPM | SYSTOLIC BLOOD PRESSURE: 118 MMHG | WEIGHT: 202 LBS | HEART RATE: 71 BPM | HEIGHT: 62 IN | DIASTOLIC BLOOD PRESSURE: 72 MMHG | OXYGEN SATURATION: 98 % | BODY MASS INDEX: 37.17 KG/M2

## 2024-09-12 DIAGNOSIS — R05.9 COUGH, UNSPECIFIED: ICD-10-CM

## 2024-09-12 DIAGNOSIS — J45.909 UNSPECIFIED ASTHMA, UNCOMPLICATED: ICD-10-CM

## 2024-09-12 PROCEDURE — 94010 BREATHING CAPACITY TEST: CPT

## 2024-09-12 PROCEDURE — 99204 OFFICE O/P NEW MOD 45 MIN: CPT | Mod: 25

## 2024-09-12 RX ORDER — INHALER, ASSIST DEVICES
SPACER (EA) MISCELLANEOUS
Qty: 1 | Refills: 4 | Status: ACTIVE | COMMUNITY
Start: 2024-09-12 | End: 1900-01-01

## 2024-09-12 RX ORDER — BUDESONIDE AND FORMOTEROL FUMARATE DIHYDRATE 160; 4.5 UG/1; UG/1
160-4.5 AEROSOL RESPIRATORY (INHALATION) TWICE DAILY
Qty: 1 | Refills: 5 | Status: DISCONTINUED | COMMUNITY
Start: 2024-09-12 | End: 2024-09-12

## 2024-09-12 RX ORDER — PREDNISONE 10 MG
TABLET ORAL
Refills: 0 | Status: ACTIVE | COMMUNITY

## 2024-09-12 RX ORDER — BUDESONIDE AND FORMOTEROL FUMARATE DIHYDRATE 80; 4.5 UG/1; UG/1
80-4.5 AEROSOL RESPIRATORY (INHALATION)
Qty: 1 | Refills: 12 | Status: ACTIVE | COMMUNITY
Start: 2024-09-12 | End: 1900-01-01

## 2024-09-12 NOTE — CONSULT LETTER
[Dear  ___] : Dear  [unfilled], [Consult Letter:] : I had the pleasure of evaluating your patient, [unfilled]. [Please see my note below.] : Please see my note below. [Sincerely,] : Sincerely, [FreeTextEntry3] : Oscar Guzman DO [facility] Director Pulmonary Division Medical Director Respiratory Therapy [facility]

## 2024-09-12 NOTE — HISTORY OF PRESENT ILLNESS
[Never] : never [TextBox_4] : hx asthma childhood, not sig as adult, was on Singulair as child uses prn rescue recent viral illness, led to worsening wheezing given medrol, uses albuterol prn now sig improving no active rhinitis cat , not sure  if allergic vaping nightly, marijuana no fever, chill, chest pain works out 3-4  cycling on [employer]  [TextBox_29] : vaping marijuana at night

## 2024-09-12 NOTE — DISCUSSION/SUMMARY
[FreeTextEntry1] : Asthma from childhood, post viral exacerbation now improved Current lung exam is normal Spirometry is normal Advised against vaping, gummies use for marijuana nocturnal No active rhinitis or GERD, cat at home, on BCPs Change to Symbicort with spacer prn and if needed BID asthma profile 6 months or sooner if needed

## 2024-09-12 NOTE — PHYSICAL EXAM
[No Acute Distress] : no acute distress [Normal Appearance] : normal appearance [Normal Rate/Rhythm] : normal rate/rhythm [Normal S1, S2] : normal s1, s2 [No Murmurs] : no murmurs [No Rubs] : no rubs [No Gallops] : no gallops [No Resp Distress] : no resp distress [Normal Rhythm and Effort] : normal rhythm and effort [Clear to Auscultation Bilaterally] : clear to auscultation bilaterally [No Abnormalities] : no abnormalities [Normal Gait] : normal gait [No Clubbing] : no clubbing [No Cyanosis] : no cyanosis [No Edema] : no edema [FROM] : FROM [Normal Color/ Pigmentation] : normal color/ pigmentation [No Focal Deficits] : no focal deficits [Oriented x3] : oriented x3 [Normal Affect] : normal affect

## 2024-09-25 RX ORDER — FLUTICASONE PROPIONATE AND SALMETEROL 100; 50 UG/1; UG/1
100-50 POWDER RESPIRATORY (INHALATION)
Qty: 3 | Refills: 0 | Status: ACTIVE | COMMUNITY
Start: 2024-09-25 | End: 1900-01-01

## 2024-11-13 ENCOUNTER — OFFICE (OUTPATIENT)
Dept: URBAN - METROPOLITAN AREA CLINIC 115 | Facility: CLINIC | Age: 24
Setting detail: OPHTHALMOLOGY
End: 2024-11-13
Payer: COMMERCIAL

## 2024-11-13 DIAGNOSIS — G93.2: ICD-10-CM

## 2024-11-13 DIAGNOSIS — H53.433: ICD-10-CM

## 2024-11-13 DIAGNOSIS — H47.11: ICD-10-CM

## 2024-11-13 DIAGNOSIS — G43.009: ICD-10-CM

## 2024-11-13 PROCEDURE — 92134 CPTRZ OPH DX IMG PST SGM RTA: CPT | Performed by: OPHTHALMOLOGY

## 2024-11-13 PROCEDURE — 92012 INTRM OPH EXAM EST PATIENT: CPT | Performed by: OPHTHALMOLOGY

## 2024-11-13 PROCEDURE — 92083 EXTENDED VISUAL FIELD XM: CPT | Performed by: OPHTHALMOLOGY

## 2024-11-13 ASSESSMENT — REFRACTION_AUTOREFRACTION
OS_SPHERE: -0.75
OS_CYLINDER: -0.25
OD_SPHERE: -1.50
OD_AXIS: 158
OS_AXIS: 104
OD_CYLINDER: -0.25

## 2024-11-13 ASSESSMENT — CONFRONTATIONAL VISUAL FIELD TEST (CVF)
OS_FINDINGS: FULL
OD_FINDINGS: FULL

## 2024-11-13 ASSESSMENT — VISUAL ACUITY
OD_BCVA: 20/20-1
OS_BCVA: 20/20-1

## 2024-11-13 ASSESSMENT — REFRACTION_MANIFEST
OS_VA1: 20/20
OU_VA: 20/20
OS_SPHERE: -0.50
OS_CYLINDER: SPH
OD_AXIS: 110
OD_VA1: 20/20
OD_CYLINDER: -0.25
OD_SPHERE: -1.25

## 2024-11-13 ASSESSMENT — REFRACTION_CURRENTRX
OS_OVR_VA: 20/
OD_SPHERE: -1.25
OD_VPRISM_DIRECTION: SV
OS_SPHERE: -0.50
OD_OVR_VA: 20/
OS_VPRISM_DIRECTION: SV

## 2024-11-13 ASSESSMENT — TONOMETRY
OD_IOP_MMHG: 16
OS_IOP_MMHG: 16

## 2024-12-20 RX ORDER — BENZONATATE 200 MG/1
200 CAPSULE ORAL 3 TIMES DAILY
Qty: 30 | Refills: 3 | Status: ACTIVE | COMMUNITY
Start: 2024-12-20 | End: 1900-01-01

## 2025-01-07 ENCOUNTER — NON-APPOINTMENT (OUTPATIENT)
Age: 25
End: 2025-01-07

## 2025-01-08 RX ORDER — PREDNISONE 10 MG/1
10 TABLET ORAL
Qty: 20 | Refills: 0 | Status: ACTIVE | COMMUNITY
Start: 2025-01-08 | End: 1900-01-01

## 2025-01-11 NOTE — DISCHARGE NOTE PROVIDER - NSDCFUADDAPPT_GEN_ALL_CORE_FT
----- Message from Kings Salazar DO sent at 12/31/2024  9:23 AM CST -----  Repeat UA   TORSTEN with Neurologist in 1 week Performed Resulted

## 2025-01-22 ENCOUNTER — APPOINTMENT (OUTPATIENT)
Dept: NEUROLOGY | Facility: CLINIC | Age: 25
End: 2025-01-22
Payer: COMMERCIAL

## 2025-01-22 VITALS
HEART RATE: 106 BPM | WEIGHT: 215 LBS | SYSTOLIC BLOOD PRESSURE: 110 MMHG | BODY MASS INDEX: 38.09 KG/M2 | HEIGHT: 63 IN | DIASTOLIC BLOOD PRESSURE: 71 MMHG

## 2025-01-22 DIAGNOSIS — H93.19 TINNITUS, UNSPECIFIED EAR: ICD-10-CM

## 2025-01-22 DIAGNOSIS — G93.2 BENIGN INTRACRANIAL HYPERTENSION: ICD-10-CM

## 2025-01-22 PROCEDURE — G2211 COMPLEX E/M VISIT ADD ON: CPT

## 2025-01-22 PROCEDURE — 99214 OFFICE O/P EST MOD 30 MIN: CPT

## 2025-02-05 RX ORDER — ALPRAZOLAM 1 MG/1
1 TABLET ORAL
Qty: 2 | Refills: 0 | Status: ACTIVE | COMMUNITY
Start: 2025-02-05 | End: 1900-01-01

## 2025-02-17 ENCOUNTER — LABORATORY RESULT (OUTPATIENT)
Age: 25
End: 2025-02-17

## 2025-02-17 ENCOUNTER — APPOINTMENT (OUTPATIENT)
Dept: FAMILY MEDICINE | Facility: CLINIC | Age: 25
End: 2025-02-17
Payer: COMMERCIAL

## 2025-02-17 VITALS
BODY MASS INDEX: 37.74 KG/M2 | WEIGHT: 213 LBS | TEMPERATURE: 97.8 F | RESPIRATION RATE: 12 BRPM | HEIGHT: 63 IN | SYSTOLIC BLOOD PRESSURE: 120 MMHG | DIASTOLIC BLOOD PRESSURE: 68 MMHG

## 2025-02-17 DIAGNOSIS — F41.8 OTHER SPECIFIED ANXIETY DISORDERS: ICD-10-CM

## 2025-02-17 PROCEDURE — 99395 PREV VISIT EST AGE 18-39: CPT

## 2025-02-17 PROCEDURE — 36415 COLL VENOUS BLD VENIPUNCTURE: CPT

## 2025-02-17 RX ORDER — ALPRAZOLAM 0.25 MG/1
0.25 TABLET ORAL
Qty: 5 | Refills: 0 | Status: ACTIVE | COMMUNITY
Start: 2025-02-17 | End: 1900-01-01

## 2025-02-18 LAB
ALBUMIN SERPL ELPH-MCNC: 4.2 G/DL
ALP BLD-CCNC: 65 U/L
ALT SERPL-CCNC: 12 U/L
ANION GAP SERPL CALC-SCNC: 13 MMOL/L
APPEARANCE: CLEAR
AST SERPL-CCNC: 16 U/L
BASOPHILS # BLD AUTO: 0.07 K/UL
BASOPHILS NFR BLD AUTO: 1 %
BILIRUB SERPL-MCNC: 0.3 MG/DL
BILIRUBIN URINE: NEGATIVE
BLOOD URINE: ABNORMAL
BUN SERPL-MCNC: 13 MG/DL
CALCIUM SERPL-MCNC: 9 MG/DL
CHLORIDE SERPL-SCNC: 109 MMOL/L
CHOLEST SERPL-MCNC: 179 MG/DL
CO2 SERPL-SCNC: 20 MMOL/L
COLOR: YELLOW
CREAT SERPL-MCNC: 0.66 MG/DL
EGFR: 125 ML/MIN/1.73M2
EOSINOPHIL # BLD AUTO: 0.51 K/UL
EOSINOPHIL NFR BLD AUTO: 7.3 %
GLUCOSE QUALITATIVE U: NEGATIVE MG/DL
GLUCOSE SERPL-MCNC: 95 MG/DL
HCT VFR BLD CALC: 44.2 %
HDLC SERPL-MCNC: 62 MG/DL
HGB BLD-MCNC: 14 G/DL
IMM GRANULOCYTES NFR BLD AUTO: 0.4 %
KETONES URINE: NEGATIVE MG/DL
LDLC SERPL CALC-MCNC: 102 MG/DL
LEUKOCYTE ESTERASE URINE: NEGATIVE
LYMPHOCYTES # BLD AUTO: 2.53 K/UL
LYMPHOCYTES NFR BLD AUTO: 36.2 %
MAN DIFF?: NORMAL
MCHC RBC-ENTMCNC: 28.1 PG
MCHC RBC-ENTMCNC: 31.7 G/DL
MCV RBC AUTO: 88.8 FL
MONOCYTES # BLD AUTO: 0.4 K/UL
MONOCYTES NFR BLD AUTO: 5.7 %
NEUTROPHILS # BLD AUTO: 3.45 K/UL
NEUTROPHILS NFR BLD AUTO: 49.4 %
NITRITE URINE: NEGATIVE
NONHDLC SERPL-MCNC: 117 MG/DL
PH URINE: 6
PLATELET # BLD AUTO: 358 K/UL
POTASSIUM SERPL-SCNC: 4.5 MMOL/L
PROT SERPL-MCNC: 6.8 G/DL
PROTEIN URINE: NEGATIVE MG/DL
RBC # BLD: 4.98 M/UL
RBC # FLD: 13.2 %
SODIUM SERPL-SCNC: 141 MMOL/L
SPECIFIC GRAVITY URINE: 1.02
TRIGL SERPL-MCNC: 83 MG/DL
TSH SERPL-ACNC: 2.3 UIU/ML
UROBILINOGEN URINE: 0.2 MG/DL
WBC # FLD AUTO: 6.99 K/UL

## 2025-02-24 ENCOUNTER — APPOINTMENT (OUTPATIENT)
Dept: MRI IMAGING | Facility: CLINIC | Age: 25
End: 2025-02-24

## 2025-02-24 ENCOUNTER — OUTPATIENT (OUTPATIENT)
Dept: OUTPATIENT SERVICES | Facility: HOSPITAL | Age: 25
LOS: 1 days | End: 2025-02-24
Payer: COMMERCIAL

## 2025-02-24 DIAGNOSIS — G93.2 BENIGN INTRACRANIAL HYPERTENSION: ICD-10-CM

## 2025-02-24 DIAGNOSIS — H93.19 TINNITUS, UNSPECIFIED EAR: ICD-10-CM

## 2025-02-24 PROCEDURE — 70544 MR ANGIOGRAPHY HEAD W/O DYE: CPT | Mod: 26

## 2025-02-24 PROCEDURE — 70544 MR ANGIOGRAPHY HEAD W/O DYE: CPT

## 2025-03-12 ENCOUNTER — OFFICE (OUTPATIENT)
Dept: URBAN - METROPOLITAN AREA CLINIC 115 | Facility: CLINIC | Age: 25
Setting detail: OPHTHALMOLOGY
End: 2025-03-12
Payer: COMMERCIAL

## 2025-03-12 DIAGNOSIS — H53.433: ICD-10-CM

## 2025-03-12 DIAGNOSIS — G93.2: ICD-10-CM

## 2025-03-12 DIAGNOSIS — G43.009: ICD-10-CM

## 2025-03-12 DIAGNOSIS — H47.11: ICD-10-CM

## 2025-03-12 PROCEDURE — 92083 EXTENDED VISUAL FIELD XM: CPT | Performed by: OPHTHALMOLOGY

## 2025-03-12 PROCEDURE — 92133 CPTRZD OPH DX IMG PST SGM ON: CPT | Performed by: OPHTHALMOLOGY

## 2025-03-12 PROCEDURE — 92012 INTRM OPH EXAM EST PATIENT: CPT | Performed by: OPHTHALMOLOGY

## 2025-03-12 ASSESSMENT — REFRACTION_MANIFEST
OD_CYLINDER: -0.25
OS_SPHERE: -0.50
OD_SPHERE: -1.25
OS_CYLINDER: SPH
OD_VA1: 20/20
OS_VA1: 20/20
OD_AXIS: 110
OU_VA: 20/20

## 2025-03-12 ASSESSMENT — REFRACTION_AUTOREFRACTION
OS_AXIS: 146
OD_AXIS: 04
OD_CYLINDER: -0.25
OS_SPHERE: -0.75
OS_CYLINDER: -0.25
OD_SPHERE: -1.00

## 2025-03-12 ASSESSMENT — TONOMETRY
OD_IOP_MMHG: 19
OS_IOP_MMHG: 17

## 2025-03-12 ASSESSMENT — REFRACTION_CURRENTRX
OS_VPRISM_DIRECTION: SV
OD_SPHERE: -1.25
OD_SPHERE: -1.25
OS_AXIS: 180
OD_VPRISM_DIRECTION: SV
OS_SPHERE: -0.50
OS_OVR_VA: 20/
OS_SPHERE: -0.50
OS_CYLINDER: 0.00
OS_OVR_VA: 20/
OD_OVR_VA: 20/
OD_AXIS: 180
OD_CYLINDER: 0.00
OD_VPRISM_DIRECTION: SV
OS_VPRISM_DIRECTION: SV
OD_OVR_VA: 20/

## 2025-03-12 ASSESSMENT — VISUAL ACUITY
OS_BCVA: 20/20
OD_BCVA: 20/20

## 2025-03-12 ASSESSMENT — CONFRONTATIONAL VISUAL FIELD TEST (CVF)
OD_FINDINGS: FULL
OS_FINDINGS: FULL

## 2025-06-18 RX ORDER — AMOXICILLIN AND CLAVULANATE POTASSIUM 875; 125 MG/1; MG/1
875-125 TABLET, COATED ORAL
Qty: 14 | Refills: 0 | Status: ACTIVE | COMMUNITY
Start: 2025-06-18 | End: 1900-01-01

## 2025-07-08 ENCOUNTER — APPOINTMENT (OUTPATIENT)
Dept: OBGYN | Facility: CLINIC | Age: 25
End: 2025-07-08

## 2025-07-08 VITALS
SYSTOLIC BLOOD PRESSURE: 118 MMHG | DIASTOLIC BLOOD PRESSURE: 80 MMHG | WEIGHT: 182 LBS | HEIGHT: 63 IN | BODY MASS INDEX: 32.25 KG/M2

## 2025-07-08 PROCEDURE — 99214 OFFICE O/P EST MOD 30 MIN: CPT | Mod: 25

## 2025-07-08 PROCEDURE — 99395 PREV VISIT EST AGE 18-39: CPT

## 2025-07-12 LAB — CYTOLOGY CVX/VAG DOC THIN PREP: NORMAL

## 2025-07-18 PROBLEM — N93.0 POSTCOITAL BLEEDING: Status: ACTIVE | Noted: 2025-07-18

## 2025-07-18 PROBLEM — N92.0 INTERMENSTRUAL SPOTTING: Status: ACTIVE | Noted: 2025-07-18

## 2025-07-21 ENCOUNTER — ASOB RESULT (OUTPATIENT)
Age: 25
End: 2025-07-21

## 2025-07-21 ENCOUNTER — APPOINTMENT (OUTPATIENT)
Dept: ANTEPARTUM | Facility: CLINIC | Age: 25
End: 2025-07-21
Payer: COMMERCIAL

## 2025-07-21 PROCEDURE — 76856 US EXAM PELVIC COMPLETE: CPT | Mod: 59

## 2025-07-21 PROCEDURE — 76830 TRANSVAGINAL US NON-OB: CPT

## 2025-07-22 ENCOUNTER — APPOINTMENT (OUTPATIENT)
Dept: NEUROLOGY | Facility: CLINIC | Age: 25
End: 2025-07-22
Payer: COMMERCIAL

## 2025-07-22 VITALS
HEART RATE: 59 BPM | BODY MASS INDEX: 31.18 KG/M2 | DIASTOLIC BLOOD PRESSURE: 78 MMHG | SYSTOLIC BLOOD PRESSURE: 119 MMHG | WEIGHT: 176 LBS | HEIGHT: 63 IN

## 2025-07-22 DIAGNOSIS — G93.2 BENIGN INTRACRANIAL HYPERTENSION: ICD-10-CM

## 2025-07-22 PROCEDURE — 99213 OFFICE O/P EST LOW 20 MIN: CPT

## 2025-07-22 PROCEDURE — G2211 COMPLEX E/M VISIT ADD ON: CPT

## 2025-08-06 ENCOUNTER — OFFICE (OUTPATIENT)
Dept: URBAN - METROPOLITAN AREA CLINIC 115 | Facility: CLINIC | Age: 25
Setting detail: OPHTHALMOLOGY
End: 2025-08-06
Payer: COMMERCIAL

## 2025-08-06 DIAGNOSIS — H47.11: ICD-10-CM

## 2025-08-06 DIAGNOSIS — H53.433: ICD-10-CM

## 2025-08-06 DIAGNOSIS — G43.009: ICD-10-CM

## 2025-08-06 DIAGNOSIS — G93.2: ICD-10-CM

## 2025-08-06 PROCEDURE — 92014 COMPRE OPH EXAM EST PT 1/>: CPT | Performed by: OPHTHALMOLOGY

## 2025-08-06 PROCEDURE — 92083 EXTENDED VISUAL FIELD XM: CPT | Performed by: OPHTHALMOLOGY

## 2025-08-06 PROCEDURE — 92133 CPTRZD OPH DX IMG PST SGM ON: CPT | Performed by: OPHTHALMOLOGY

## 2025-08-06 ASSESSMENT — REFRACTION_MANIFEST
OU_VA: 20/20
OD_SPHERE: -1.25
OS_SPHERE: -0.50
OD_CYLINDER: -0.25
OD_VA1: 20/20
OD_AXIS: 110
OS_VA1: 20/20
OS_CYLINDER: SPH

## 2025-08-06 ASSESSMENT — REFRACTION_CURRENTRX
OD_VPRISM_DIRECTION: SV
OS_VPRISM_DIRECTION: SV
OD_SPHERE: -1.25
OD_VPRISM_DIRECTION: SV
OS_VPRISM_DIRECTION: SV
OS_AXIS: 180
OD_OVR_VA: 20/
OD_OVR_VA: 20/
OS_SPHERE: -0.50
OS_SPHERE: -0.50
OD_SPHERE: -1.25
OS_OVR_VA: 20/
OD_CYLINDER: 0.00
OD_AXIS: 180
OS_OVR_VA: 20/
OS_CYLINDER: 0.00

## 2025-08-06 ASSESSMENT — REFRACTION_AUTOREFRACTION
OD_SPHERE: -1.00
OD_CYLINDER: -0.75
OS_SPHERE: -0.75
OS_CYLINDER: 0.00
OS_AXIS: 000
OD_AXIS: 003

## 2025-08-06 ASSESSMENT — CONFRONTATIONAL VISUAL FIELD TEST (CVF)
OS_FINDINGS: FULL
OD_FINDINGS: FULL

## 2025-08-06 ASSESSMENT — TONOMETRY
OS_IOP_MMHG: 12
OD_IOP_MMHG: 14

## 2025-08-06 ASSESSMENT — VISUAL ACUITY
OS_BCVA: 20/20
OD_BCVA: 20/20

## 2025-08-12 DIAGNOSIS — H10.9 UNSPECIFIED CONJUNCTIVITIS: ICD-10-CM

## 2025-08-12 RX ORDER — TOBRAMYCIN 3 MG/ML
0.3 SOLUTION/ DROPS OPHTHALMIC
Qty: 1 | Refills: 0 | Status: ACTIVE | COMMUNITY
Start: 2025-08-12 | End: 1900-01-01

## 2025-08-28 ENCOUNTER — APPOINTMENT (OUTPATIENT)
Dept: OBGYN | Facility: CLINIC | Age: 25
End: 2025-08-28

## (undated) DEVICE — VENODYNE/SCD SLEEVE CALF MEDIUM

## (undated) DEVICE — TUBING IV EXTENSION MACRO W CLAVE 7"

## (undated) DEVICE — DRSG CURITY GAUZE SPONGE 4 X 4" 12-PLY NON-STERILE

## (undated) DEVICE — UNDERPAD LINEN SAVER 23 X 36"

## (undated) DEVICE — DENTURE CUP PINK

## (undated) DEVICE — SOL IRR BAG H2O 1000ML

## (undated) DEVICE — BITE BLOCK ADULT 20 X 27MM (GREEN)

## (undated) DEVICE — CATH IV SAFE BC 22G X 1" (BLUE)

## (undated) DEVICE — GOWN IMPERV XL

## (undated) DEVICE — WARMING BLANKET FULL ADULT

## (undated) DEVICE — MASK PROC EAR LOOP

## (undated) DEVICE — SYR SLIP 10CC

## (undated) DEVICE — SYR IV FLUSH SALINE 10ML 30/TY

## (undated) DEVICE — PACK IV START WITH CHG

## (undated) DEVICE — SYR LUER SLIP TIP 50CC

## (undated) DEVICE — SOL IRR BAG NS 0.9% 1000ML

## (undated) DEVICE — FORCEP RADIAL JAW 4 W NDL 2.4MM 2.8MM 240CM ORANGE DISP

## (undated) DEVICE — FORCEP BIOPSY ENDOSCOPIC 2.8MM DISP

## (undated) DEVICE — SOL BAG NS 0.9% 1000ML

## (undated) DEVICE — DRSG 2X2

## (undated) DEVICE — SENSOR O2 FINGER ADULT

## (undated) DEVICE — TUBING ALARIS PUMP MODULE NON-DEHP